# Patient Record
Sex: FEMALE | Race: OTHER | NOT HISPANIC OR LATINO | Employment: FULL TIME | URBAN - METROPOLITAN AREA
[De-identification: names, ages, dates, MRNs, and addresses within clinical notes are randomized per-mention and may not be internally consistent; named-entity substitution may affect disease eponyms.]

---

## 2023-09-29 LAB — TOBACCO SCREEN, URINE: NEGATIVE

## 2024-01-29 ENCOUNTER — OFFICE VISIT (OUTPATIENT)
Dept: OBSTETRICS AND GYNECOLOGY | Facility: CLINIC | Age: 44
End: 2024-01-29
Payer: COMMERCIAL

## 2024-01-29 VITALS
BODY MASS INDEX: 25.58 KG/M2 | WEIGHT: 139 LBS | HEIGHT: 62 IN | DIASTOLIC BLOOD PRESSURE: 76 MMHG | SYSTOLIC BLOOD PRESSURE: 118 MMHG

## 2024-01-29 DIAGNOSIS — Z12.31 VISIT FOR SCREENING MAMMOGRAM: ICD-10-CM

## 2024-01-29 DIAGNOSIS — Z13.1 DIABETES MELLITUS SCREENING: ICD-10-CM

## 2024-01-29 DIAGNOSIS — Z13.220 LIPID SCREENING: ICD-10-CM

## 2024-01-29 DIAGNOSIS — Z01.419 ENCOUNTER FOR ANNUAL ROUTINE GYNECOLOGICAL EXAMINATION: ICD-10-CM

## 2024-01-29 DIAGNOSIS — N81.4 UTERINE PROLAPSE: Primary | ICD-10-CM

## 2024-01-29 PROCEDURE — 87624 HPV HI-RISK TYP POOLED RSLT: CPT

## 2024-01-29 PROCEDURE — 88141 CYTOPATH C/V INTERPRET: CPT | Performed by: PATHOLOGY

## 2024-01-29 PROCEDURE — 99396 PREV VISIT EST AGE 40-64: CPT | Performed by: OBSTETRICS & GYNECOLOGY

## 2024-01-29 PROCEDURE — 88175 CYTOPATH C/V AUTO FLUID REDO: CPT

## 2024-01-29 ASSESSMENT — ENCOUNTER SYMPTOMS
HEMATURIA: 0
ARTHRALGIAS: 0
LIGHT-HEADEDNESS: 0
EYE REDNESS: 0
SHORTNESS OF BREATH: 0
SINUS PRESSURE: 0
FREQUENCY: 0
ABDOMINAL PAIN: 0
DIZZINESS: 0
RHINORRHEA: 0
WHEEZING: 0
COUGH: 0
JOINT SWELLING: 0
DYSPHORIC MOOD: 0
WEAKNESS: 0
NUMBNESS: 0
CHILLS: 0
BACK PAIN: 0
NERVOUS/ANXIOUS: 0
EYE DISCHARGE: 0
HEADACHES: 0
CONSTIPATION: 0
MYALGIAS: 0
VOMITING: 0
DIARRHEA: 0
PALPITATIONS: 0
SORE THROAT: 0
DYSURIA: 0
CHEST TIGHTNESS: 0
NAUSEA: 0
FEVER: 0
DIFFICULTY URINATING: 0

## 2024-01-29 ASSESSMENT — PAIN SCALES - GENERAL: PAINLEVEL: 0-NO PAIN

## 2024-01-29 NOTE — PROGRESS NOTES
I saw and evaluated the patient. I personally obtained the key and critical portions of the history and physical exam or was physically present for key and critical portions performed by the resident/fellow. I reviewed the resident/fellow's documentation and discussed the patient with the resident/fellow. I agree with the resident/fellow's medical decision making as documented in the note.    Edyta Wilkinson MD

## 2024-01-29 NOTE — PROGRESS NOTES
Subjective   Patient ID: Chanda Licona is a 43 y.o. female who presents for Annual Exam.    HPI  - Hx of uterine prolapse  - Primarily incontinence symptoms, worse with coughing.   - Thinks she might have rectocele now, constipation, and with standing she can feel it descending the longer she stands  - Tried pessary, did not stay in place  - Has not been to PT d/t schedule, she is an interventional cardiologist at [employer]  - Does Elena consistently  - Has previously seen Dr. Holbrook  - Dr Morrison primary care. Has not seen her for a few years. Wondering if Dr. Wilkinson can check her basic annual labs.   - Discussed colonoscopy now that she is 42yo.  - Patient with IUD in place    Review of Systems   Constitutional:  Negative for chills and fever.   HENT:  Negative for congestion, ear pain, rhinorrhea, sinus pressure and sore throat.    Eyes:  Negative for discharge, redness and visual disturbance.   Respiratory:  Negative for cough, chest tightness, shortness of breath and wheezing.    Cardiovascular:  Negative for chest pain, palpitations and leg swelling.   Gastrointestinal:  Negative for abdominal pain, constipation, diarrhea, nausea and vomiting.   Genitourinary:  Negative for difficulty urinating, dysuria, frequency, hematuria, urgency, vaginal bleeding, vaginal discharge and vaginal pain.   Musculoskeletal:  Negative for arthralgias, back pain, joint swelling and myalgias.   Skin:  Negative for rash.   Neurological:  Negative for dizziness, weakness, light-headedness, numbness and headaches.   Psychiatric/Behavioral:  Negative for behavioral problems and dysphoric mood. The patient is not nervous/anxious.      Objective   Physical Exam  Exam conducted with a chaperone present.   Constitutional:       Appearance: Normal appearance. She is normal weight.   HENT:      Head: Normocephalic and atraumatic.   Pulmonary:      Effort: Pulmonary effort is normal.   Chest:   Breasts:     Right: Normal.      Left:  Normal.   Genitourinary:     General: Normal vulva.      Vagina: Normal.      Cervix: Normal.      Uterus: With uterine prolapse.       Adnexa: Right adnexa normal and left adnexa normal.      Rectum: Normal.      Comments: Rectocele visible   Skin:     General: Skin is warm and dry.   Neurological:      General: No focal deficit present.      Mental Status: She is alert.   Psychiatric:         Mood and Affect: Mood normal.         Behavior: Behavior normal.       Assessment/Plan   Chanda is a 42 yo female who presents for annual well women's exam. Patient also with concern that her uterine prolapse has worsened and possibly developing a rectocele as well.    Problem List Items Addressed This Visit    None  Visit Diagnoses         Codes    Uterine prolapse    -  Primary  - Patient previously saw Dr. Holbrook, will put in a referral N81.4    Relevant Orders    Referral to Urogynecology    Encounter for annual routine gynecological examination      - Patient with hx of ASCUS last Pap  - Will follow up with results Z01.419    Relevant Orders    THINPREP PAP    CBC    TSH with reflex to Free T4 if abnormal    Comprehensive Metabolic Panel    Visit for screening mammogram     Z12.31    Relevant Orders    BI mammo bilateral screening tomosynthesis    Lipid screening     Z13.220    Relevant Orders    Lipid Panel   - Patient requesting basic annual lab work and will follow up with her PCP  - RTC for annual well women's or sooner if needed.     Patient was seen and discussed with attending physician Minh.    Leyla Dent,   Family Medicine, PGY-2

## 2024-02-12 LAB
CYTOLOGY CMNT CVX/VAG CYTO-IMP: NORMAL
HPV HR 12 DNA GENITAL QL NAA+PROBE: NEGATIVE
HPV HR GENOTYPES PNL CVX NAA+PROBE: NEGATIVE
HPV16 DNA SPEC QL NAA+PROBE: NEGATIVE
HPV18 DNA SPEC QL NAA+PROBE: NEGATIVE
LAB AP HPV GENOTYPE QUESTION: YES
LAB AP HPV HR: NORMAL
LABORATORY COMMENT REPORT: NORMAL
PATH REPORT.TOTAL CANCER: NORMAL

## 2024-02-26 ENCOUNTER — TELEPHONE (OUTPATIENT)
Dept: OBSTETRICS AND GYNECOLOGY | Facility: CLINIC | Age: 44
End: 2024-02-26
Payer: COMMERCIAL

## 2024-02-26 NOTE — TELEPHONE ENCOUNTER
----- Message from Edyta Wilkinson MD sent at 2/25/2024 10:50 PM EST -----  Repeat PAP in 12 months

## 2024-02-27 ENCOUNTER — TELEPHONE (OUTPATIENT)
Dept: OBSTETRICS AND GYNECOLOGY | Facility: CLINIC | Age: 44
End: 2024-02-27
Payer: COMMERCIAL

## 2024-02-27 NOTE — TELEPHONE ENCOUNTER
Contacted pt and verified name and .  Pt notified of her test results and per Dr. Wilkinson pt is too repeat pap in one  year  Pt states understanding and denies having questions at this time.

## 2024-04-08 ENCOUNTER — OFFICE VISIT (OUTPATIENT)
Dept: OBSTETRICS AND GYNECOLOGY | Facility: CLINIC | Age: 44
End: 2024-04-08
Payer: COMMERCIAL

## 2024-04-08 VITALS
HEIGHT: 62 IN | DIASTOLIC BLOOD PRESSURE: 61 MMHG | SYSTOLIC BLOOD PRESSURE: 95 MMHG | HEART RATE: 73 BPM | WEIGHT: 142 LBS | BODY MASS INDEX: 26.13 KG/M2

## 2024-04-08 DIAGNOSIS — N81.4 CYSTOCELE WITH UTERINE PROLAPSE: Primary | ICD-10-CM

## 2024-04-08 DIAGNOSIS — N81.4 UTERINE PROLAPSE: ICD-10-CM

## 2024-04-08 LAB
POC APPEARANCE, URINE: CLEAR
POC BILIRUBIN, URINE: NEGATIVE
POC BLOOD, URINE: ABNORMAL
POC COLOR, URINE: YELLOW
POC GLUCOSE, URINE: NEGATIVE MG/DL
POC KETONES, URINE: NEGATIVE MG/DL
POC LEUKOCYTES, URINE: ABNORMAL
POC NITRITE,URINE: NEGATIVE
POC PH, URINE: 6 PH
POC PROTEIN, URINE: NEGATIVE MG/DL
POC SPECIFIC GRAVITY, URINE: >=1.03
POC UROBILINOGEN, URINE: 0.2 EU/DL

## 2024-04-08 PROCEDURE — 1036F TOBACCO NON-USER: CPT | Performed by: OBSTETRICS & GYNECOLOGY

## 2024-04-08 PROCEDURE — 51798 US URINE CAPACITY MEASURE: CPT | Performed by: OBSTETRICS & GYNECOLOGY

## 2024-04-08 PROCEDURE — 99213 OFFICE O/P EST LOW 20 MIN: CPT | Performed by: OBSTETRICS & GYNECOLOGY

## 2024-04-08 PROCEDURE — 81003 URINALYSIS AUTO W/O SCOPE: CPT | Mod: 91,QW | Performed by: OBSTETRICS & GYNECOLOGY

## 2024-04-08 ASSESSMENT — PAIN SCALES - GENERAL: PAINLEVEL: 0-NO PAIN

## 2024-04-08 NOTE — PROGRESS NOTES
Urogynecology  Provider:  Dana Holbrook MD  [phone]              ASSESSMENT AND PLAN:     Problem List Items Addressed This Visit    None          I spent a total of eConsult Time: 25 minutes in face to face and non face to face time.        Dana Holbrook MD        HISTORY OF PRESENT ILLNESS:     Last seen 2021 for POP that was stable  Was to have IUD changed with Minh in   POP-Q: Stage: 2 and patient was standing. Aa: 0. C: -2 TVL: 10 Ap: -3. D: -3.      Urge incontinence is worse.  She is feeling like POP is worse.  She is having some more constipation recently. We discussed adding some fiber to her diet and hydrating and that this is more likely due to stool consistency. She is worried that she might have a small rectocele as well per Dr. Wilkinson.  She tried a pessary in the past and fell out so not an option.    She most notices the POP when she is in the cath lab doing procedures at the end of the day.   She likes the POP is larger.    When she has a URI she does leak urine with sneeze and cough as well.     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     Past Medical History:      Past Medical History:   Diagnosis Date    Personal history of other diseases of the nervous system and sense organs     History of migraine    Unspecified asthma, uncomplicated     Asthma, acute          Past Surgical History:       Past Surgical History:   Procedure Laterality Date     SECTION, CLASSIC  10/25/2016     Section         Medications:       Prior to Admission medications    Not on File        ROS  Review of Systems     Blood, Urine   Date Value Ref Range Status   2021 SMALL (1+) (A) NEGATIVE Final     Nitrite, Urine   Date Value Ref Range Status   2021 NEGATIVE NEGATIVE Final     Urobilinogen, Urine   Date Value Ref Range Status   2021 <2.0 0.0 - 1.9 mg/dL Final         PHYSICAL EXAM:      There were no vitals taken for this visit.     No LMP recorded. Patient  "has had an implant.      Declines chaperone for physical exam.      Well developed, well nourished, in no apparent distress.   Neurologic/Psychiatric:  Awake, Alert and Oriented times 3.  Affect normal.     GENITAL/URINARY:       External Genitalia:  The patient has normal appearing external genitalia, normal skenes and bartholins glands, and a normal hair distribution.  Her vulva is without lesions, erythema or discharge.  It is non-tender with appropriate sensation.     Urethral Meatus:  Size normal, Location normal, Lesions absent, Prolapse absent,      Urethra:  Fullness absent, Masses absent,      Bladder:  Fullness absent, Masses absent, Tenderness absent,      Vagina:  General appearance normal, Estrogen effect normal, Discharge absent, Lesions absent, normal     Cervix: Normal, no discharge.   Uterus:  normal size  Adnexa: normal     Stress urinary incontinence not demonstrable.         POP-Q:  Stage: 2  Position: standing    Aa: 0       Ba:  C: +1   Gh:  Pb:  TVL: 10         Ap: -1 Bp:  D: -2               Data and DIAGNOSTIC STUDIES REVIEWED   No results found.   No results found for: \"URINECULTURE\", \"UAMICCOMM\"   Lab Results   Component Value Date    GLUCOSE 87 08/18/2021    CALCIUM 9.2 08/18/2021     08/18/2021    K 4.2 08/18/2021    CO2 26 08/18/2021     08/18/2021    BUN 11 08/18/2021    CREATININE 0.70 08/18/2021     Lab Results   Component Value Date    WBC 7.7 08/18/2021    HGB 14.2 08/18/2021    HCT 46.2 (H) 08/18/2021    MCV 91 08/18/2021     08/18/2021        We discussed frankly that her POP is worse and her cervix is now at +1 which explains her worsening symptoms.  She is not ready to have a surgical repair but we discussed what this would entail, including the procedure and time off.  Given her degree of POP and young age I do think that she would most likely benefit from a RSCH, RSCPXY, BS, MUS, poss post rep, perineorr. We discussed outcomes and risks and benefits.  She " has a HX of an ASCUS pap in Jan 2024 but HPV (-). She will need followup. She might consider a total hyst.  She will also clearly need a sling when she opts to have surgery.    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   Beyond this, she is personally bothered enough to have surgery at this time.  We discussed that the treatment (surgery) will be the same even if her POP gets worse and there is no reason to limit her daily activities currently.  She tried a pessary previously without success so this is not an option.  We agreed to have her come in to be seen sooner and she will FU in 4-5 months  We will monitor her for now.  She is also considering doing the [employer] in the near future.    Dana Holbrook MD

## 2024-05-10 ENCOUNTER — HOSPITAL ENCOUNTER (OUTPATIENT)
Dept: RADIOLOGY | Facility: CLINIC | Age: 44
Discharge: HOME | End: 2024-05-10
Payer: COMMERCIAL

## 2024-05-10 VITALS — HEIGHT: 62 IN | BODY MASS INDEX: 26.13 KG/M2 | WEIGHT: 141.98 LBS

## 2024-05-10 DIAGNOSIS — Z12.31 VISIT FOR SCREENING MAMMOGRAM: ICD-10-CM

## 2024-05-10 PROCEDURE — 77063 BREAST TOMOSYNTHESIS BI: CPT | Performed by: STUDENT IN AN ORGANIZED HEALTH CARE EDUCATION/TRAINING PROGRAM

## 2024-05-10 PROCEDURE — 77067 SCR MAMMO BI INCL CAD: CPT

## 2024-05-10 PROCEDURE — 77067 SCR MAMMO BI INCL CAD: CPT | Performed by: STUDENT IN AN ORGANIZED HEALTH CARE EDUCATION/TRAINING PROGRAM

## 2024-09-15 NOTE — PROGRESS NOTES
Urogynecology  Provider:  Dana Holbrook MD  [phone]      ASSESSMENT AND PLAN:   44 year old female with stage 3 uterovaginal prolapse and UUI.     Diagnoses:  #1 Uterovaginal prolapse, stage 3  #2 Overactive bladder    Plan:  1. Uterovaginal prolapse  - We reviewed the only time to consider intervention for POP is when a patient has urinary retention from the prolapse causing inability to completley empty their bladder or the POP becomes symptomatically bothersome.   - Reviewed surgical options to reduce POP such as lx USLS, TLH, BS, WV/perineorrhaphy vs. SCPXY with WV/perineorrhaphy and TLH (given her ASC-US pap in January 2024. She is more interested in the USLS > SCPXY. We reassured her that we do not use Vicryl suture material but rather use 2-0 Goretex sutures in the USLS which is a permanent suture and V-Loc PDS suturevto close the vaginal cuff after the TLH. However, at this time she wishes to continue monitoring her prolapse symptoms at this time.   - Pop-Q today Aa: 0, C: +2, TVL: 10, and Ap: -1.  Very minimal POP progression noted.  - Continue POP surveillance in 6-8 months and splinting as needed.   - Discussed that surgery will likely be needed at some point but she does not appear to be that symptomatic currently to justify.  - We did discuss surgery options and currently I would rec a TLH/USLS/BS/A&P/perineorr/poss MUS  - She does endorse some PATRICE symptoms    2. OAB, UUI  - PVR = 0mL by bladder U/S.  - She is not really bothered by POP but mostly with OAB symptoms  - Reviewed that UUI may or may not improve with a POP repair surgery as UUI is mostly attributed to a neurological parasympathetic overactivity between the brain and bladder.   - Discussed treatment options for managing OAB such as starting a medication (Solifenacin). She is amenable to this plan.   - Sent Rx of Solifenacin 5mg to her preferred pharmacy with instructions to take 1 pill po QD. This medication will help improve  bladder compliance by decreasing intensity of the bladder spasms thus improving urinary urgency and ability to make it to the bathroom in time without experiencing UUI. Discussed the drug profile being an anticholinergic and possible medication side effects including dry mouth, dry eyes, and constipation.    Follow up in 2 months with Dr. Holbrook for a virtual visit for an OAB medication check.     Scribe Attestation  By signing my name below, I, Rad Farrell, Shyanneibe, attest that this documentation has been prepared under the direction and in the presence of Dana Holbrook MD on 09/16/2024 at 6:03 PM.     Agree with above. I Dr. Holbrook, personally performed the services described in the documentation which was scribed virtually and confirm it is both complete and accurate.  Dana Holbrook MD        Problem List Items Addressed This Visit    None          I spent a total of eConsult Time: 35 minutes in face to face and non face to face time.        Dana Holbrook MD        HISTORY OF PRESENT ILLNESS:   44 year old female following up for a POP check.     Record Review:   - Last visit 4/2024:  We discussed frankly that her POP is worse and her cervix is now at +1 which explains her worsening symptoms.  She is not ready to have a surgical repair but we discussed what this would entail, including the procedure and time off.  Given her degree of POP and young age I do think that she would most likely benefit from a RSCH, RSCPXY, BS, MUS, poss post rep, perineorr. We discussed outcomes and risks and benefits.  She has a HX of an ASCUS pap in Jan 2024 but HPV (-). She will need followup. She might consider a total hyst.  She will also clearly need a sling when she opts to have surgery.    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   Beyond this, she is personally bothered enough to have surgery at this time.  We discussed that the treatment (surgery)  will be the same even if her POP gets worse and there is no reason to limit her daily activities currently.  She tried a pessary previously without success so this is not an option.  We agreed to have her come in to be seen sooner and she will FU in 4-5 months  We will monitor her for now.    Pop-Q: Aa 0  C +1  TVL 10  Ap -1     Prolapse Symptoms:  - Her prolapse has slightly worsened in stage or degree of bother in comparison to x6 months ago and she attributes this to working more while her partner is on maternity leave.   - She continues to splint her POP 1-2x per day dependent upon the time she stands. However, she notes that after a 2 hour procedure she begins to feel uncomfortable due to her prolapse.      Urinary Symptoms:   - Patient reports being bothered by UUI.   - She has noticed that a decrease in her ability to control her bladder.     OBGYN History:  - 2024 pap: ASC-US with plan to repeat in 12 months after that time.       Past Medical History:    Past Medical History:   Diagnosis Date    Personal history of other diseases of the nervous system and sense organs     History of migraine    Unspecified asthma, uncomplicated ([facility])     Asthma, acute        Past Surgical History:     Past Surgical History:   Procedure Laterality Date     SECTION, CLASSIC  10/25/2016     Section       Medications:     Prior to Admission medications    Not on File        ROS  Review of Systems   Constitutional: Negative.    HENT: Negative.     Eyes: Negative.    Respiratory: Negative.     Cardiovascular: Negative.    Gastrointestinal: Negative.    Endocrine: Negative.    Genitourinary:  Positive for enuresis and urgency.   Musculoskeletal: Negative.    Neurological: Negative.    Psychiatric/Behavioral: Negative.          POC Blood, Urine   Date Value Ref Range Status   2024 TRACE-Lysed (A) NEGATIVE Final     Poc Nitrite, Urine   Date Value Ref Range Status   2024 NEGATIVE NEGATIVE Final  "    POC Urobilinogen, Urine   Date Value Ref Range Status   04/08/2024 0.2 0.2, 1.0 EU/DL Final     POC Leukocytes, Urine   Date Value Ref Range Status   04/08/2024 SMALL (1+) (A) NEGATIVE Final         PHYSICAL EXAM:    There were no vitals taken for this visit.  No LMP recorded. Patient has had an implant.      Declines chaperone for physical exam.      Well developed, well nourished, in no apparent distress.   Neurologic/Psychiatric:  Awake, Alert and Oriented times 3.  Affect normal.     GENITAL/URINARY:     External Genitalia:  The patient has normal appearing external genitalia, normal skenes and bartholins glands, and a normal hair distribution.  Her vulva is without lesions, erythema or discharge.  It is non-tender with appropriate sensation.     Urethral Meatus:  Size normal, Location normal, Lesions absent, Prolapse absent.    Urethra:  Fullness absent, Masses absent.    Bladder:  Fullness absent, Masses absent, Tenderness absent.    Vagina:  General appearance normal, Estrogen effect normal, Discharge absent, Lesions absent. Normal vaginal epithelium.      Cervix: Normal, no discharge.   Uterus:  normal size, mobile, and nontender  Adnexa: normal     Anus/Perineum:  Lesions absent and masses absent. Normal anus and perineum.      Stress urinary incontinence not demonstrable.         POP-Q:  Stage: 3  Position: standing    Aa: 0       Ba: 0 C: +2   Gh:  Pb:  TVL: 10         Ap: -1 Bp: -1 D: 0               Data and DIAGNOSTIC STUDIES REVIEWED   No results found.   No results found for: \"URINECULTURE\", \"UAMICCOMM\"   Lab Results   Component Value Date    GLUCOSE 87 08/18/2021    CALCIUM 9.2 08/18/2021     08/18/2021    K 4.2 08/18/2021    CO2 26 08/18/2021     08/18/2021    BUN 11 08/18/2021    CREATININE 0.70 08/18/2021     Lab Results   Component Value Date    WBC 7.7 08/18/2021    HGB 14.2 08/18/2021    HCT 46.2 (H) 08/18/2021    MCV 91 08/18/2021     08/18/2021          Dana T " MD Yusef

## 2024-09-16 ENCOUNTER — OFFICE VISIT (OUTPATIENT)
Dept: OBSTETRICS AND GYNECOLOGY | Facility: CLINIC | Age: 44
End: 2024-09-16
Payer: COMMERCIAL

## 2024-09-16 VITALS
BODY MASS INDEX: 25.52 KG/M2 | DIASTOLIC BLOOD PRESSURE: 73 MMHG | WEIGHT: 144 LBS | HEIGHT: 63 IN | SYSTOLIC BLOOD PRESSURE: 110 MMHG | HEART RATE: 83 BPM

## 2024-09-16 DIAGNOSIS — N32.81 OVERACTIVE BLADDER: Primary | ICD-10-CM

## 2024-09-16 DIAGNOSIS — N39.9 URINARY DISORDER: ICD-10-CM

## 2024-09-16 LAB
POC APPEARANCE, URINE: CLEAR
POC BILIRUBIN, URINE: NEGATIVE
POC BLOOD, URINE: ABNORMAL
POC COLOR, URINE: YELLOW
POC GLUCOSE, URINE: ABNORMAL MG/DL
POC KETONES, URINE: NEGATIVE MG/DL
POC LEUKOCYTES, URINE: NEGATIVE
POC NITRITE,URINE: NEGATIVE
POC PH, URINE: 6.5 PH
POC PROTEIN, URINE: NEGATIVE MG/DL
POC SPECIFIC GRAVITY, URINE: 1.02
POC UROBILINOGEN, URINE: 0.2 EU/DL

## 2024-09-16 PROCEDURE — 51798 US URINE CAPACITY MEASURE: CPT | Performed by: OBSTETRICS & GYNECOLOGY

## 2024-09-16 PROCEDURE — 81003 URINALYSIS AUTO W/O SCOPE: CPT | Performed by: OBSTETRICS & GYNECOLOGY

## 2024-09-16 PROCEDURE — 3008F BODY MASS INDEX DOCD: CPT | Performed by: OBSTETRICS & GYNECOLOGY

## 2024-09-16 PROCEDURE — 99214 OFFICE O/P EST MOD 30 MIN: CPT | Performed by: OBSTETRICS & GYNECOLOGY

## 2024-09-16 RX ORDER — SOLIFENACIN SUCCINATE 5 MG/1
5 TABLET, FILM COATED ORAL DAILY
Qty: 30 TABLET | Refills: 3 | Status: SHIPPED | OUTPATIENT
Start: 2024-09-16 | End: 2025-09-16

## 2024-09-16 ASSESSMENT — ENCOUNTER SYMPTOMS
ENDOCRINE NEGATIVE: 1
CARDIOVASCULAR NEGATIVE: 1
EYES NEGATIVE: 1
GASTROINTESTINAL NEGATIVE: 1
CONSTITUTIONAL NEGATIVE: 1
PSYCHIATRIC NEGATIVE: 1
MUSCULOSKELETAL NEGATIVE: 1
RESPIRATORY NEGATIVE: 1
NEUROLOGICAL NEGATIVE: 1

## 2024-09-16 ASSESSMENT — PAIN SCALES - GENERAL: PAINLEVEL: 0-NO PAIN

## 2024-11-10 NOTE — PROGRESS NOTES
Urogynecology  Provider:  Dana Holbrook MD  [phone]              ASSESSMENT AND PLAN:   44-year-old female being assessed for stage 3 uterovaginal prolapse and overactive bladder.    Diagnoses:  #1 Stage 3 uterovaginal prolapse  #2 Overactive bladder    Plan:  Stage 3 uterovaginal prolapse  - She has a hx of unsuccessful trying a pessary for prolapse. Unable to retain the pessary in past due to POP size.  - She is considering surgical intervention for prolapse, as she is becoming more bothered by the bulge being down and needing to push it up while at work.  - Plan to perform robotic scpxy/RSCH to address POP.  - Schedule UDS prior to the surgery.  - Ordered UDS.  - Will consider placing a sling during surgery to address PATRICE.  - Discussed potential recovery time and options for returning to work.  - Plan for surgery over the summer, with a tentative date for after June 24.    2. OAB  - Continue Vesicare 5 mg once daily at night time.    Follow-up with Dr. Holbrook post-UDN to discuss results and finalize surgical plans.  Case request placed.    Virtual visit today: The patient's identity was confirmed and that she is located in [place].   Dana Holbrook MD identified herself and the patient consented to a telehealth visit today. Telehealth visit time: 16 minutes    Scribe Attestation  By signing my name below, IJann Scribe, attest that this documentation has been prepared under the direction and in the presence of Dana Holbrook MD on 11/11/2024 at 5:26 PM.    Agree with above. I Dr. Holbrook, personally performed the services described in the documentation which was scribed virtually and confirm it is both complete and accurate.  Dana Holbrook MD      Problem List Items Addressed This Visit    None          I spent a total of 20 minutes in face to face and non face to face time at this virtual visit.          Dana Holbrook MD        HISTORY OF PRESENT ILLNESS:     Last visit  9/2024  ASSESSMENT AND PLAN:   44 year old female with stage 3 uterovaginal prolapse and UUI.      Diagnoses:  #1 Uterovaginal prolapse, stage 3  #2 Overactive bladder     Plan:  1. Uterovaginal prolapse  - We reviewed the only time to consider intervention for POP is when a patient has urinary retention from the prolapse causing inability to completley empty their bladder or the POP becomes symptomatically bothersome.   - Reviewed surgical options to reduce POP such as lx USLS, TLH, BS, VA/perineorrhaphy vs. SCPXY with VA/perineorrhaphy and TLH (given her ASC-US pap in January 2024. She is more interested in the USLS > SCPXY. We reassured her that we do not use Vicryl suture material but rather use 2-0 Goretex sutures in the USLS which is a permanent suture and V-Loc PDS suturevto close the vaginal cuff after the TLH. However, at this time she wishes to continue monitoring her prolapse symptoms at this time.   - Pop-Q today Aa: 0, C: +2, TVL: 10, and Ap: -1.  Very minimal POP progression noted.  - Continue POP surveillance in 6-8 months and splinting as needed.   - Discussed that surgery will likely be needed at some point but she does not appear to be that symptomatic currently to justify.  - We did discuss surgery options and currently I would rec a TLH/USLS/BS/A&P/perineorr/poss MUS  - She does endorse some PATRICE symptoms     2. OAB, UUI  - PVR = 0mL by bladder U/S.  - She is not really bothered by POP but mostly with OAB symptoms  - Reviewed that UUI may or may not improve with a POP repair surgery as UUI is mostly attributed to a neurological parasympathetic overactivity between the brain and bladder.   - Discussed treatment options for managing OAB such as starting a medication (Solifenacin). She is amenable to this plan.   - Sent Rx of Solifenacin 5mg to her preferred pharmacy with instructions to take 1 pill po QD. This medication will help improve bladder compliance by decreasing intensity of the bladder  spasms thus improving urinary urgency and ability to make it to the bathroom in time without experiencing UUI. Discussed the drug profile being an anticholinergic and possible medication side effects including dry mouth, dry eyes, and constipation.     Follow up in 2 months with Dr. Holbrook for a virtual visit for an OAB medication check.           Prolapse Symptoms :  - She tried a pessary 10 years ago that just continued to fall out.  - She is considering surgical intervention for prolapse, which is causing significant bother due to stretchy tissue.  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  - She has to push the bulge up after standing for extended periods.  - She feels emotionally and physically discomforted by the prolapse and is considering surgical intervention, just not instantly.     Urinary Symptoms:   - The medication is not helping PATRICE but it is helping UUI to some point.  - She takes it during night so it doesn't cause bothersome side effects.  - She notes improved urgency.  - She experienced bad PATRICE during a recent URI.       Past Medical History:       Past Medical History:   Diagnosis Date    Personal history of other diseases of the nervous system and sense organs     History of migraine    Unspecified asthma, uncomplicated ([facility])     Asthma, acute          Past Surgical History:       Past Surgical History:   Procedure Laterality Date     SECTION, CLASSIC  10/25/2016     Section         Medications:       Prior to Admission medications    Medication Sig Start Date End Date Taking? Authorizing Provider   solifenacin (Vesicare) 5 mg tablet Take 1 tablet (5 mg) by mouth once daily. Swallow tablet whole; do not crush, chew, or split. 24  Dana Holbrook MD        ROS  Review of Systems   Constitutional: Negative.    HENT: Negative.     Eyes: Negative.    Respiratory: Negative.     Cardiovascular:  "Negative.    Gastrointestinal: Negative.    Endocrine: Negative.    Genitourinary:  Positive for frequency and urgency.        PATRICE   Musculoskeletal: Negative.    Neurological: Negative.    Psychiatric/Behavioral: Negative.            Data and DIAGNOSTIC STUDIES REVIEWED   No results found.   No results found for: \"URINECULTURE\", \"UAMICCOMM\"   Lab Results   Component Value Date    GLUCOSE 87 08/18/2021    CALCIUM 9.2 08/18/2021     08/18/2021    K 4.2 08/18/2021    CO2 26 08/18/2021     08/18/2021    BUN 11 08/18/2021    CREATININE 0.70 08/18/2021     Lab Results   Component Value Date    WBC 7.7 08/18/2021    HGB 14.2 08/18/2021    HCT 46.2 (H) 08/18/2021    MCV 91 08/18/2021     08/18/2021            "

## 2024-11-11 ENCOUNTER — PREP FOR PROCEDURE (OUTPATIENT)
Dept: OBSTETRICS AND GYNECOLOGY | Facility: CLINIC | Age: 44
End: 2024-11-11
Payer: COMMERCIAL

## 2024-11-11 ENCOUNTER — TELEMEDICINE (OUTPATIENT)
Dept: OBSTETRICS AND GYNECOLOGY | Facility: CLINIC | Age: 44
End: 2024-11-11
Payer: COMMERCIAL

## 2024-11-11 DIAGNOSIS — N81.4 UTERINE PROLAPSE: Primary | ICD-10-CM

## 2024-11-11 DIAGNOSIS — N39.3 STRESS INCONTINENCE: ICD-10-CM

## 2024-11-11 DIAGNOSIS — N81.2 UTEROVAGINAL PROLAPSE, INCOMPLETE: Primary | ICD-10-CM

## 2024-11-11 PROCEDURE — 99213 OFFICE O/P EST LOW 20 MIN: CPT | Performed by: OBSTETRICS & GYNECOLOGY

## 2024-11-11 RX ORDER — PHENAZOPYRIDINE HYDROCHLORIDE 200 MG/1
200 TABLET, FILM COATED ORAL ONCE
OUTPATIENT
Start: 2024-11-11 | End: 2024-11-11

## 2024-11-11 RX ORDER — ACETAMINOPHEN 325 MG/1
975 TABLET ORAL ONCE
OUTPATIENT
Start: 2024-11-11 | End: 2024-11-11

## 2024-11-11 RX ORDER — GABAPENTIN 600 MG/1
600 TABLET ORAL ONCE
OUTPATIENT
Start: 2024-11-11 | End: 2024-11-11

## 2024-11-11 RX ORDER — CELECOXIB 400 MG/1
400 CAPSULE ORAL ONCE
OUTPATIENT
Start: 2024-11-11 | End: 2024-11-11

## 2024-11-12 ASSESSMENT — ENCOUNTER SYMPTOMS
FREQUENCY: 1
CONSTITUTIONAL NEGATIVE: 1
EYES NEGATIVE: 1
RESPIRATORY NEGATIVE: 1
NEUROLOGICAL NEGATIVE: 1
CARDIOVASCULAR NEGATIVE: 1
PSYCHIATRIC NEGATIVE: 1
ENDOCRINE NEGATIVE: 1
MUSCULOSKELETAL NEGATIVE: 1
GASTROINTESTINAL NEGATIVE: 1

## 2024-12-09 PROBLEM — N39.3 STRESS INCONTINENCE: Status: ACTIVE | Noted: 2024-11-11

## 2024-12-09 PROBLEM — N81.2 UTEROVAGINAL PROLAPSE, INCOMPLETE: Status: ACTIVE | Noted: 2024-11-11

## 2024-12-28 ENCOUNTER — TELEPHONE (OUTPATIENT)
Dept: OBSTETRICS AND GYNECOLOGY | Facility: CLINIC | Age: 44
End: 2024-12-28
Payer: COMMERCIAL

## 2024-12-28 NOTE — TELEPHONE ENCOUNTER
Originally spoke with patient back in 11/20/2024 regarding possible dates for surgery with Dr Holbrook at [facility]. Discussed dates between May and June 2025. She agree with date of 6/6/2025. Procedure finalized for a robotic supracervical hysterectomy, bilateral salpingectomy, sacrocolpopexy. In addition to posterior repair with perineorraphy, possible mid-urethral sling and cystoscopy. CPM not ordered at this time. Patient has appointment for UDS 4/23/2025 @ 245 pm and virtual pre-op appointment with Dr Holbrook on 4/28/2025 @ 515 pm.

## 2025-02-03 ENCOUNTER — APPOINTMENT (OUTPATIENT)
Dept: OBSTETRICS AND GYNECOLOGY | Facility: CLINIC | Age: 45
End: 2025-02-03
Payer: COMMERCIAL

## 2025-02-03 VITALS
SYSTOLIC BLOOD PRESSURE: 112 MMHG | HEIGHT: 64 IN | DIASTOLIC BLOOD PRESSURE: 76 MMHG | BODY MASS INDEX: 24.86 KG/M2 | WEIGHT: 145.6 LBS

## 2025-02-03 DIAGNOSIS — Z01.419 WELL WOMAN EXAM: Primary | ICD-10-CM

## 2025-02-03 DIAGNOSIS — Z12.4 SCREENING FOR MALIGNANT NEOPLASM OF CERVIX: ICD-10-CM

## 2025-02-03 DIAGNOSIS — Z12.11 SCREENING FOR COLON CANCER: ICD-10-CM

## 2025-02-03 PROCEDURE — 88141 CYTOPATH C/V INTERPRET: CPT | Performed by: PATHOLOGY

## 2025-02-03 PROCEDURE — 88175 CYTOPATH C/V AUTO FLUID REDO: CPT

## 2025-02-03 PROCEDURE — 99396 PREV VISIT EST AGE 40-64: CPT | Performed by: OBSTETRICS & GYNECOLOGY

## 2025-02-03 PROCEDURE — 3008F BODY MASS INDEX DOCD: CPT | Performed by: OBSTETRICS & GYNECOLOGY

## 2025-02-03 PROCEDURE — 87624 HPV HI-RISK TYP POOLED RSLT: CPT

## 2025-02-03 ASSESSMENT — ENCOUNTER SYMPTOMS
GASTROINTESTINAL NEGATIVE: 0
PSYCHIATRIC NEGATIVE: 0
ALLERGIC/IMMUNOLOGIC NEGATIVE: 0
LOSS OF SENSATION IN FEET: 0
DEPRESSION: 0
CARDIOVASCULAR NEGATIVE: 0
ENDOCRINE NEGATIVE: 0
HEMATOLOGIC/LYMPHATIC NEGATIVE: 0
OCCASIONAL FEELINGS OF UNSTEADINESS: 0
EYES NEGATIVE: 0
MUSCULOSKELETAL NEGATIVE: 0
RESPIRATORY NEGATIVE: 0
NEUROLOGICAL NEGATIVE: 0
CONSTITUTIONAL NEGATIVE: 0

## 2025-02-03 ASSESSMENT — PAIN SCALES - GENERAL: PAINLEVEL_OUTOF10: 0-NO PAIN

## 2025-02-04 LAB
ALBUMIN SERPL-MCNC: 4.2 G/DL (ref 3.6–5.1)
ALP SERPL-CCNC: 76 U/L (ref 31–125)
ALT SERPL-CCNC: 14 U/L (ref 6–29)
ANION GAP SERPL CALCULATED.4IONS-SCNC: 9 MMOL/L (CALC) (ref 7–17)
AST SERPL-CCNC: 14 U/L (ref 10–35)
BILIRUB SERPL-MCNC: 0.4 MG/DL (ref 0.2–1.2)
BUN SERPL-MCNC: 12 MG/DL (ref 7–25)
CALCIUM SERPL-MCNC: 8.9 MG/DL (ref 8.6–10.2)
CHLORIDE SERPL-SCNC: 108 MMOL/L (ref 98–110)
CHOLEST SERPL-MCNC: 178 MG/DL
CHOLEST/HDLC SERPL: 5.6 (CALC)
CO2 SERPL-SCNC: 23 MMOL/L (ref 20–32)
CREAT SERPL-MCNC: 0.85 MG/DL (ref 0.5–0.99)
EGFRCR SERPLBLD CKD-EPI 2021: 86 ML/MIN/1.73M2
ERYTHROCYTE [DISTWIDTH] IN BLOOD BY AUTOMATED COUNT: 12.4 % (ref 11–15)
GLUCOSE SERPL-MCNC: 92 MG/DL (ref 65–139)
HCT VFR BLD AUTO: 41.2 % (ref 35–45)
HDLC SERPL-MCNC: 32 MG/DL
HGB BLD-MCNC: 13.4 G/DL (ref 11.7–15.5)
LDLC SERPL CALC-MCNC: 116 MG/DL (CALC)
MCH RBC QN AUTO: 28.2 PG (ref 27–33)
MCHC RBC AUTO-ENTMCNC: 32.5 G/DL (ref 32–36)
MCV RBC AUTO: 86.6 FL (ref 80–100)
NONHDLC SERPL-MCNC: 146 MG/DL (CALC)
PLATELET # BLD AUTO: 280 THOUSAND/UL (ref 140–400)
PMV BLD REES-ECKER: 12 FL (ref 7.5–12.5)
POTASSIUM SERPL-SCNC: 4 MMOL/L (ref 3.5–5.3)
PROT SERPL-MCNC: 6.6 G/DL (ref 6.1–8.1)
RBC # BLD AUTO: 4.76 MILLION/UL (ref 3.8–5.1)
SODIUM SERPL-SCNC: 140 MMOL/L (ref 135–146)
TRIGL SERPL-MCNC: 179 MG/DL
TSH SERPL-ACNC: 1.74 MIU/L
WBC # BLD AUTO: 8.4 THOUSAND/UL (ref 3.8–10.8)

## 2025-02-05 PROBLEM — Z01.419 WELL WOMAN EXAM: Status: ACTIVE | Noted: 2025-02-03

## 2025-02-05 NOTE — PROGRESS NOTES
Subjective   Patient ID: Chanda Licona is a 44 y.o. female who presents for Annual Exam (Last pap 1/24).  Pt is here for her annual exam  She also has uterine prolapse  ( Stage 3)and is planning surgery   She also has uterine incontinence.        Review of Systems   All other systems reviewed and are negative.      Objective   Physical Exam  Constitutional:       Appearance: Normal appearance. She is normal weight.   Pulmonary:      Effort: Pulmonary effort is normal.   Chest:   Breasts:     Right: Normal.      Left: Normal.   Genitourinary:     General: Normal vulva.      Vagina: Normal.      Cervix: Normal.      Uterus: Normal.       Adnexa: Right adnexa normal and left adnexa normal.      Rectum: Normal.   Musculoskeletal:      Cervical back: Neck supple.   Skin:     General: Skin is warm.   Neurological:      Mental Status: She is alert.   Psychiatric:         Attention and Perception: Attention normal.         Mood and Affect: Mood normal.         Behavior: Behavior normal.         Assessment/Plan   Diagnoses and all orders for this visit:  Well woman exam  -     CBC; Future  -     Comprehensive Metabolic Panel; Future  -     TSH with reflex to Free T4 if abnormal; Future  -     Lipid Panel; Future  Screening for malignant neoplasm of cervix  -     THINPREP PAP TEST  -     HPV DNA High Risk With Genotype  Screening for colon cancer  -     Colonoscopy Screening; Average Risk Patient; Future           Edyta Wilkinson MD 02/05/25 12:13 PM

## 2025-04-23 ENCOUNTER — APPOINTMENT (OUTPATIENT)
Dept: OBSTETRICS AND GYNECOLOGY | Facility: CLINIC | Age: 45
End: 2025-04-23
Payer: COMMERCIAL

## 2025-04-28 ENCOUNTER — APPOINTMENT (OUTPATIENT)
Dept: OBSTETRICS AND GYNECOLOGY | Facility: CLINIC | Age: 45
End: 2025-04-28
Payer: COMMERCIAL

## 2025-04-30 ENCOUNTER — APPOINTMENT (OUTPATIENT)
Dept: OBSTETRICS AND GYNECOLOGY | Facility: CLINIC | Age: 45
End: 2025-04-30
Payer: COMMERCIAL

## 2025-05-05 ENCOUNTER — APPOINTMENT (OUTPATIENT)
Dept: OBSTETRICS AND GYNECOLOGY | Facility: CLINIC | Age: 45
End: 2025-05-05
Payer: COMMERCIAL

## 2025-05-21 ENCOUNTER — PATIENT MESSAGE (OUTPATIENT)
Dept: UROLOGY | Facility: HOSPITAL | Age: 45
End: 2025-05-21
Payer: COMMERCIAL

## 2025-05-28 ENCOUNTER — APPOINTMENT (OUTPATIENT)
Dept: OBSTETRICS AND GYNECOLOGY | Facility: CLINIC | Age: 45
End: 2025-05-28
Payer: COMMERCIAL

## 2025-06-11 ENCOUNTER — PROCEDURE VISIT (OUTPATIENT)
Dept: OBSTETRICS AND GYNECOLOGY | Facility: CLINIC | Age: 45
End: 2025-06-11
Payer: COMMERCIAL

## 2025-06-11 ENCOUNTER — TELEPHONE (OUTPATIENT)
Dept: UROLOGY | Facility: HOSPITAL | Age: 45
End: 2025-06-11
Payer: COMMERCIAL

## 2025-06-11 DIAGNOSIS — N39.3 STRESS INCONTINENCE: ICD-10-CM

## 2025-06-11 PROCEDURE — 51797 INTRAABDOMINAL PRESSURE TEST: CPT | Performed by: OBSTETRICS & GYNECOLOGY

## 2025-06-11 PROCEDURE — 51798 US URINE CAPACITY MEASURE: CPT | Performed by: OBSTETRICS & GYNECOLOGY

## 2025-06-11 PROCEDURE — 51729 CYSTOMETROGRAM W/VP&UP: CPT | Performed by: OBSTETRICS & GYNECOLOGY

## 2025-06-11 PROCEDURE — 51741 ELECTRO-UROFLOWMETRY FIRST: CPT | Performed by: OBSTETRICS & GYNECOLOGY

## 2025-06-11 PROCEDURE — 51784 ANAL/URINARY MUSCLE STUDY: CPT | Performed by: OBSTETRICS & GYNECOLOGY

## 2025-06-11 NOTE — TELEPHONE ENCOUNTER
"6/11/2025  1:54 PM    Patient ID verified. Called patient to discuss research study titled \"Mood Changes After Pelvic Organ Prolapse Surgery\". Patient was interested in discussing the study. Patient declined Zoom call and requested follow up phone call. Consent form emailed to patient. Follow up phone call scheduled for today at 6 pm.    Roselyn Keller MD, ERIKA, BSN  URPS Fellow, PGY-5  "

## 2025-06-11 NOTE — TELEPHONE ENCOUNTER
"6/11/2025  6:10 PM    Patient ID verified. Patient consented to participate in the research study titled \"Mood Changes After Pelvic Organ Prolapse Surgery\" via phone. All questions answered. Patient signed consent form via  [employer].    Roselyn Keller MD, ERIKA, BSN  URPS Fellow, PGY-5   "

## 2025-06-15 NOTE — PROGRESS NOTES
Urogynecology  Provider:  Dana Holbrook MD  [phone]    ASSESSMENT AND PLAN:   45 year old female with OAB, PATRICE, and stage 3 uterovaginal prolapse. She has an acute URI with rhinorrhea, sneezing, and head congestion.     Diagnoses:  #1 Uterovaginal prolapse, stage 3  #2 Stress urinary incontinence   #3 Upper respiratory infection (URI)    Plan:  1. Uterovaginal prolapse, PATRICE  - Reviewed her UDN results which demonstrated +PATRICE, normal MUCPs, and a normal EMG and voiding pattern.   - Discussed PATRICE surgical management options including periurethral bulking vs midurethral sling.   > She is interested in proceeding with the midurethral sling.   - Reviewed the risks, benefits and alternatives of midurethral sling. Risks include around a 1% risk of mesh complications or erosion, 10-30% risk of urinary retention, risk of bleeding, infection, 5% risk of perforating the bladder when placing the sling, and damage to surrounding organs including bladder, urethra, ureters, bowel and blood vessels, as well as risk of fistula formation. We discussed the postoperative voiding trial and plan based on the results of this trial. We discussed potential development or worsening of urgency incontinence, and potential need for sling lysis or removal in the future. We discussed an 92-95% cure or improvement rate with the midurethral sling. Discussed that midurethral sling does not address urgency incontinence specifically but some patients with mixed incontinence report improvement in urgency symptoms. She understands she may still need treatment for her urgency urinary incontinence after this procedure. Perioperative antibiotics will be administered. Pain control post op with alternating Tylenol and ibuprofen, with Oxycodone available for breakthrough pain. Recommend daily MiraLAX to avoid constipation postoperatively. The postoperative recovery course includes 6 weeks of no heavy lifting >10 pounds and pelvic rest (I.e. no tub  soaking, swimming, or penetrative intercourse).     - We reviewed the risks, benefits, procedure steps, expected outcomes, and postoperative recovery of the sacrocolpopexy. Counseled that the SCP involves placing a Y-shaped polypropylene mesh to the sacral ligament in her pelvis to suspend her pelvic organs. This surgery has a high success rate of 85%. Reviewed that there is a 2-4% risk of mesh exposure which if this occurs is not life-threatening, largely asymptomatic, and is not dangerous; if mesh exposure occurs we would place her on tv estrogen to help the epithelium grow over the exposed mesh. A small percentage of people who experience mesh erosion might need a subsequent mesh excision surgery. The general risk of surgery include bleeding, infection, and damage to surrounding structures with need for further surgery. There is a 10-30% risk of experiencing POUR for which she would be temporarily catheterized until she passes her voiding trial if she fails her void trial in the PACU. After surgery she will need to be on pelvic rest with no heavy lifting over 10 pounds x6 weeks and we recommend no driving the first 1-2 weeks after surgery until she stops narcotics for postoperative pain control. We stressed the importance of preventing/managing constipation as straining to have a bowel movement puts her at higher risk of experiencing recurrent prolapse or undoing her sutures - encouraged her to start MiraLAX daily in a postoperative setting. She might experience pain around the site of where the abdominal port was placed during surgery and her postoperative pain may be managed by alternating Tylenol/Ibuprofen and she will be given a small Rx of Oxycodone for breakthrough pain.   - Preoperative labs include CBC, BMP, and blood typing.   - Surgery scheduled to include a robotic-assisted CRISTÓBAL, robotic-assisted SCPXY, BS, +/- APR, midurethral sling, and cystoscopy with Dr. Holbrook @ [facility] on 6/20/2025.     2. URI  -  Recently began experiencing URI symptoms x4 days ago with sneezing, head congestion, and rhinorrhea but tested negative for Covid yesterday on 6/15/2025. However, no fever or coughing fits.   - Sent Rx to begin taking Azithromycin 500 mg po for a total of x3 days.     Follow up 2 weeks after surgery with Dr. Holbrook for her first postoperative visit.     Telehealth visit time: 12 minutes     Scribe Attestation  By signing my name below, I, Rad Farrell, Scribe, attest that this documentation has been prepared under the direction and in the presence of Dana Holbrook MD on 06/16/2025 at 11:41 PM.     Agree with above. I Dr. Holbrook, personally performed the services described in the documentation which was scribed virtually and confirm it is both complete and accurate.  Dana Holbrook MD      Problem List Items Addressed This Visit    None  Visit Diagnoses         Upper respiratory tract infection, unspecified type    -  Primary    Relevant Medications    azithromycin (Zithromax) 500 mg tablet                I spent a total of 17 minutes in face to face and non face to face time at this virtual visit.          Dana Holbrook MD    Virtual or Telephone Consent:   An interactive audio and video telecommunication system which permits real time communications between the patient (at the originating site) and provider (at the distant site) was utilized to provide this telehealth service.   Verbal consent was requested and obtained from Chanda Licona on this date, 06/16/25 for a telehealth visit and the patient's location was confirmed at the time of the visit.      HISTORY OF PRESENT ILLNESS:   45 year old female presenting in virtual visit follow up to discuss UDN results and to finalize the surgical plan.     Records Review:   - Last visit 11/2024  Diagnoses:  #1 Stage 3 uterovaginal prolapse  #2 Overactive bladder     Plan:  Stage 3 uterovaginal prolapse  - She has a hx of unsuccessful trying a pessary  for prolapse. Unable to retain the pessary in past due to POP size.  - She is considering surgical intervention for prolapse, as she is becoming more bothered by the bulge being down and needing to push it up while at work.  - Plan to perform robotic scpxy/RSCH to address POP.  - Schedule UDS prior to the surgery.  - Ordered UDS.  - Will consider placing a sling during surgery to address PATRICE.  - Discussed potential recovery time and options for returning to work.  - Plan for surgery over the summer, with a tentative date for after June 24.     2. OAB  - Continue Vesicare 5 mg once daily at night time.     Follow-up with Dr. Holbrook post-UDN to discuss results and finalize surgical plans.     >> UDN: +PATRICE, normal MUCPs, normal voiding pattern and EMG     Urinary Symptoms:   - The patient is interested in scheduling a midurethral sling to reduce the risk of occult PATRICE leakage.   - She is amenable to proceeding with her previously scheduled SCPXY on 6/20/2025.     Respiratory Symptoms:  - Recently began experiencing URI symptoms x4 days ago with sneezing, head congestion, and rhinorrhea but tested negative for Covid yesterday on 6/15/2025.   - Denies coughing fits or fevers.   - Patient is interested in trying a Z-pack to help resolve her symptoms before her scheduled surgery.       Past Medical History:     Medical History[1]       Past Surgical History:     Surgical History[2]      Medications:     Prior to Admission medications    Not on File        ROS  Review of Systems   Constitutional: Negative.    HENT:  Positive for congestion, rhinorrhea and sneezing.    Eyes: Negative.    Respiratory: Negative.     Cardiovascular: Negative.    Gastrointestinal: Negative.    Endocrine: Negative.    Genitourinary:  Positive for enuresis.   Musculoskeletal: Negative.    Neurological: Negative.    Psychiatric/Behavioral: Negative.            Data and DIAGNOSTIC STUDIES REVIEWED   Imaging  No results found.    Cardiology,  "Vascular, and Other Imaging  No other imaging results found for the past 7 days     No results found for: \"URINECULTURE\", \"UAMICCOMM\"   Lab Results   Component Value Date    GLUCOSE 92 2025    CALCIUM 8.9 2025     2025    K 4.0 2025    CO2 23 2025     2025    BUN 12 2025    CREATININE 0.85 2025     Lab Results   Component Value Date    WBC 8.4 2025    HGB 13.4 2025    HCT 41.2 2025    MCV 86.6 2025     2025             [1]   Past Medical History:  Diagnosis Date    Personal history of other diseases of the nervous system and sense organs     History of migraine    Unspecified asthma, uncomplicated ([facility])     Asthma, acute   [2]   Past Surgical History:  Procedure Laterality Date     SECTION, CLASSIC  10/25/2016     Section     "

## 2025-06-16 ENCOUNTER — TELEMEDICINE (OUTPATIENT)
Dept: OBSTETRICS AND GYNECOLOGY | Facility: CLINIC | Age: 45
End: 2025-06-16
Payer: COMMERCIAL

## 2025-06-16 DIAGNOSIS — J06.9 UPPER RESPIRATORY TRACT INFECTION, UNSPECIFIED TYPE: Primary | ICD-10-CM

## 2025-06-16 PROCEDURE — 99212 OFFICE O/P EST SF 10 MIN: CPT | Performed by: OBSTETRICS & GYNECOLOGY

## 2025-06-16 RX ORDER — AZITHROMYCIN 500 MG/1
500 TABLET, FILM COATED ORAL DAILY
Qty: 3 TABLET | Refills: 0 | Status: SHIPPED | OUTPATIENT
Start: 2025-06-16 | End: 2025-06-21 | Stop reason: HOSPADM

## 2025-06-16 ASSESSMENT — ENCOUNTER SYMPTOMS
EYES NEGATIVE: 1
MUSCULOSKELETAL NEGATIVE: 1
RESPIRATORY NEGATIVE: 1
CARDIOVASCULAR NEGATIVE: 1
PSYCHIATRIC NEGATIVE: 1
RHINORRHEA: 1
NEUROLOGICAL NEGATIVE: 1
ENDOCRINE NEGATIVE: 1
GASTROINTESTINAL NEGATIVE: 1
CONSTITUTIONAL NEGATIVE: 1

## 2025-06-18 ENCOUNTER — LAB (OUTPATIENT)
Dept: LAB | Facility: HOSPITAL | Age: 45
End: 2025-06-18
Payer: COMMERCIAL

## 2025-06-18 DIAGNOSIS — N81.2 UTEROVAGINAL PROLAPSE, INCOMPLETE: ICD-10-CM

## 2025-06-18 DIAGNOSIS — N81.2 INCOMPLETE UTEROVAGINAL PROLAPSE: Primary | ICD-10-CM

## 2025-06-18 DIAGNOSIS — N39.3 STRESS INCONTINENCE: ICD-10-CM

## 2025-06-18 LAB
ABO GROUP (TYPE) IN BLOOD: NORMAL
ANION GAP SERPL CALC-SCNC: 13 MMOL/L (ref 10–20)
ANTIBODY SCREEN: NORMAL
BUN SERPL-MCNC: 12 MG/DL (ref 6–23)
CALCIUM SERPL-MCNC: 9 MG/DL (ref 8.6–10.6)
CHLORIDE SERPL-SCNC: 104 MMOL/L (ref 98–107)
CO2 SERPL-SCNC: 27 MMOL/L (ref 21–32)
CREAT SERPL-MCNC: 0.58 MG/DL (ref 0.5–1.05)
EGFRCR SERPLBLD CKD-EPI 2021: >90 ML/MIN/1.73M*2
ERYTHROCYTE [DISTWIDTH] IN BLOOD BY AUTOMATED COUNT: 13.1 % (ref 11.5–14.5)
GLUCOSE SERPL-MCNC: 116 MG/DL (ref 74–99)
HCT VFR BLD AUTO: 42.8 % (ref 36–46)
HGB BLD-MCNC: 13.3 G/DL (ref 12–16)
MCH RBC QN AUTO: 28.3 PG (ref 26–34)
MCHC RBC AUTO-ENTMCNC: 31.1 G/DL (ref 32–36)
MCV RBC AUTO: 91 FL (ref 80–100)
NRBC BLD-RTO: 0 /100 WBCS (ref 0–0)
PLATELET # BLD AUTO: 245 X10*3/UL (ref 150–450)
POTASSIUM SERPL-SCNC: 4.3 MMOL/L (ref 3.5–5.3)
RBC # BLD AUTO: 4.7 X10*6/UL (ref 4–5.2)
RH FACTOR (ANTIGEN D): NORMAL
SODIUM SERPL-SCNC: 140 MMOL/L (ref 136–145)
WBC # BLD AUTO: 6.6 X10*3/UL (ref 4.4–11.3)

## 2025-06-18 PROCEDURE — 86900 BLOOD TYPING SEROLOGIC ABO: CPT

## 2025-06-18 PROCEDURE — 85027 COMPLETE CBC AUTOMATED: CPT

## 2025-06-18 PROCEDURE — 36415 COLL VENOUS BLD VENIPUNCTURE: CPT

## 2025-06-18 PROCEDURE — 86850 RBC ANTIBODY SCREEN: CPT

## 2025-06-18 PROCEDURE — 80048 BASIC METABOLIC PNL TOTAL CA: CPT

## 2025-06-18 PROCEDURE — 86901 BLOOD TYPING SEROLOGIC RH(D): CPT

## 2025-06-19 ENCOUNTER — ANESTHESIA EVENT (OUTPATIENT)
Dept: OPERATING ROOM | Facility: HOSPITAL | Age: 45
End: 2025-06-19
Payer: COMMERCIAL

## 2025-06-19 NOTE — PROGRESS NOTES
Patient ID: Chanda Licona is a 45 y.o. female.    Uroflowmetry    Date/Time: 6/11/2025 8:46 AM    Performed by: Elda Rubio LPN  Authorized by: Dana Holbrook MD    Procedure discussed: discussed risks, benefits and alternatives    Chaperone present: no    Timeout: timeout called immediately prior to procedure      Procedure Details     Procedure: CMG with UPP/voiding pressures, EMG, intra-abdominal voiding study and uroflow      CMG with UPP/Voiding Pressures Details:     First urge to void (mL): 151    Urgency to void (mL): 175    Bladder capacity (mL): 286    Intra-abdominal Voiding Study Details:     Rectal catheter placed to record intra-abdominal pressure: yes      Uroflow Details:     Pre-void volume (mL): 64.2    Voiding duration (sec): 15.5    Average flow rate (mL/sec): 6    Max flow rate (mL/sec): 11.3    Voided urine (mL): 305    Residual urine (mL): 0    Post-Procedure Details     Outcome: patient tolerated procedure well with no complications      Additional Details      Positive for stress incontinence.    +PATRICE with normal MUCPS and normal EMG and voiding. Plan for sling.  Dana Holbrook MD

## 2025-06-20 ENCOUNTER — PHARMACY VISIT (OUTPATIENT)
Dept: PHARMACY | Facility: CLINIC | Age: 45
End: 2025-06-20
Payer: COMMERCIAL

## 2025-06-20 ENCOUNTER — ANESTHESIA (OUTPATIENT)
Dept: OPERATING ROOM | Facility: HOSPITAL | Age: 45
End: 2025-06-20
Payer: COMMERCIAL

## 2025-06-20 ENCOUNTER — HOSPITAL ENCOUNTER (OUTPATIENT)
Facility: HOSPITAL | Age: 45
Discharge: HOME | End: 2025-06-21
Attending: OBSTETRICS & GYNECOLOGY | Admitting: OBSTETRICS & GYNECOLOGY
Payer: COMMERCIAL

## 2025-06-20 DIAGNOSIS — N39.3 STRESS INCONTINENCE: ICD-10-CM

## 2025-06-20 DIAGNOSIS — N81.9 FEMALE GENITAL PROLAPSE, UNSPECIFIED TYPE: Primary | ICD-10-CM

## 2025-06-20 DIAGNOSIS — R11.0 POSTOPERATIVE NAUSEA: ICD-10-CM

## 2025-06-20 DIAGNOSIS — N81.2 UTEROVAGINAL PROLAPSE, INCOMPLETE: ICD-10-CM

## 2025-06-20 DIAGNOSIS — Z98.890 POSTOPERATIVE NAUSEA: ICD-10-CM

## 2025-06-20 PROBLEM — G89.18 POSTOPERATIVE PAIN: Status: ACTIVE | Noted: 2025-06-20

## 2025-06-20 PROBLEM — J06.9 RECENT URI: Status: ACTIVE | Noted: 2025-06-20

## 2025-06-20 PROBLEM — R11.2 POSTOPERATIVE NAUSEA AND VOMITING: Status: ACTIVE | Noted: 2025-06-20

## 2025-06-20 LAB — PREGNANCY TEST URINE, POC: NEGATIVE

## 2025-06-20 PROCEDURE — 3700000001 HC GENERAL ANESTHESIA TIME - INITIAL BASE CHARGE: Performed by: OBSTETRICS & GYNECOLOGY

## 2025-06-20 PROCEDURE — C1771 REP DEV, URINARY, W/SLING: HCPCS | Performed by: OBSTETRICS & GYNECOLOGY

## 2025-06-20 PROCEDURE — 88304 TISSUE EXAM BY PATHOLOGIST: CPT | Performed by: PATHOLOGY

## 2025-06-20 PROCEDURE — 7100000010 HC PHASE TWO TIME - EACH INCREMENTAL 1 MINUTE: Performed by: OBSTETRICS & GYNECOLOGY

## 2025-06-20 PROCEDURE — 7100000009 HC PHASE TWO TIME - INITIAL BASE CHARGE: Performed by: OBSTETRICS & GYNECOLOGY

## 2025-06-20 PROCEDURE — 2500000004 HC RX 250 GENERAL PHARMACY W/ HCPCS (ALT 636 FOR OP/ED): Performed by: ANESTHESIOLOGIST ASSISTANT

## 2025-06-20 PROCEDURE — RXMED WILLOW AMBULATORY MEDICATION CHARGE

## 2025-06-20 PROCEDURE — 3600000017 HC OR TIME - EACH INCREMENTAL 1 MINUTE - PROCEDURE LEVEL SIX: Performed by: OBSTETRICS & GYNECOLOGY

## 2025-06-20 PROCEDURE — 57425 LAPAROSCOPY SURG COLPOPEXY: CPT | Performed by: OBSTETRICS & GYNECOLOGY

## 2025-06-20 PROCEDURE — 88307 TISSUE EXAM BY PATHOLOGIST: CPT | Mod: TC,AHULAB | Performed by: OBSTETRICS & GYNECOLOGY

## 2025-06-20 PROCEDURE — 2500000001 HC RX 250 WO HCPCS SELF ADMINISTERED DRUGS (ALT 637 FOR MEDICARE OP): Performed by: OBSTETRICS & GYNECOLOGY

## 2025-06-20 PROCEDURE — 88307 TISSUE EXAM BY PATHOLOGIST: CPT | Performed by: PATHOLOGY

## 2025-06-20 PROCEDURE — 2500000005 HC RX 250 GENERAL PHARMACY W/O HCPCS: Performed by: ANESTHESIOLOGIST ASSISTANT

## 2025-06-20 PROCEDURE — 7100000002 HC RECOVERY ROOM TIME - EACH INCREMENTAL 1 MINUTE: Performed by: OBSTETRICS & GYNECOLOGY

## 2025-06-20 PROCEDURE — 2500000004 HC RX 250 GENERAL PHARMACY W/ HCPCS (ALT 636 FOR OP/ED): Performed by: STUDENT IN AN ORGANIZED HEALTH CARE EDUCATION/TRAINING PROGRAM

## 2025-06-20 PROCEDURE — 81025 URINE PREGNANCY TEST: CPT | Performed by: ANESTHESIOLOGY

## 2025-06-20 PROCEDURE — C1781 MESH (IMPLANTABLE): HCPCS | Performed by: OBSTETRICS & GYNECOLOGY

## 2025-06-20 PROCEDURE — 58542 LSH W/T/O UT 250 G OR LESS: CPT | Performed by: OBSTETRICS & GYNECOLOGY

## 2025-06-20 PROCEDURE — 3600000018 HC OR TIME - INITIAL BASE CHARGE - PROCEDURE LEVEL SIX: Performed by: OBSTETRICS & GYNECOLOGY

## 2025-06-20 PROCEDURE — 2500000004 HC RX 250 GENERAL PHARMACY W/ HCPCS (ALT 636 FOR OP/ED): Performed by: OBSTETRICS & GYNECOLOGY

## 2025-06-20 PROCEDURE — 2720000007 HC OR 272 NO HCPCS: Performed by: OBSTETRICS & GYNECOLOGY

## 2025-06-20 PROCEDURE — 2500000004 HC RX 250 GENERAL PHARMACY W/ HCPCS (ALT 636 FOR OP/ED): Performed by: ANESTHESIOLOGY

## 2025-06-20 PROCEDURE — 2780000003 HC OR 278 NO HCPCS: Performed by: OBSTETRICS & GYNECOLOGY

## 2025-06-20 PROCEDURE — 57250 REPAIR RECTUM & VAGINA: CPT | Performed by: OBSTETRICS & GYNECOLOGY

## 2025-06-20 PROCEDURE — 57288 REPAIR BLADDER DEFECT: CPT | Performed by: OBSTETRICS & GYNECOLOGY

## 2025-06-20 PROCEDURE — 2500000005 HC RX 250 GENERAL PHARMACY W/O HCPCS: Performed by: ANESTHESIOLOGY

## 2025-06-20 PROCEDURE — 7100000011 HC EXTENDED STAY RECOVERY HOURLY - NURSING UNIT

## 2025-06-20 PROCEDURE — 7100000001 HC RECOVERY ROOM TIME - INITIAL BASE CHARGE: Performed by: OBSTETRICS & GYNECOLOGY

## 2025-06-20 PROCEDURE — 2500000005 HC RX 250 GENERAL PHARMACY W/O HCPCS: Performed by: OBSTETRICS & GYNECOLOGY

## 2025-06-20 PROCEDURE — 3700000002 HC GENERAL ANESTHESIA TIME - EACH INCREMENTAL 1 MINUTE: Performed by: OBSTETRICS & GYNECOLOGY

## 2025-06-20 PROCEDURE — 2500000001 HC RX 250 WO HCPCS SELF ADMINISTERED DRUGS (ALT 637 FOR MEDICARE OP): Performed by: STUDENT IN AN ORGANIZED HEALTH CARE EDUCATION/TRAINING PROGRAM

## 2025-06-20 PROCEDURE — S2900 ROBOTIC SURGICAL SYSTEM: HCPCS | Performed by: OBSTETRICS & GYNECOLOGY

## 2025-06-20 PROCEDURE — 96372 THER/PROPH/DIAG INJ SC/IM: CPT | Performed by: STUDENT IN AN ORGANIZED HEALTH CARE EDUCATION/TRAINING PROGRAM

## 2025-06-20 DEVICE — IMPLANTABLE DEVICE: Type: IMPLANTABLE DEVICE | Site: ABDOMEN | Status: FUNCTIONAL

## 2025-06-20 DEVICE — TRANSVAGINAL MID-URETHRAL SLING
Type: IMPLANTABLE DEVICE | Site: PELVIS | Status: FUNCTIONAL
Brand: ADVANTAGE FIT™ BLUE SYSTEM

## 2025-06-20 RX ORDER — MIDAZOLAM HYDROCHLORIDE 1 MG/ML
INJECTION INTRAMUSCULAR; INTRAVENOUS AS NEEDED
Status: DISCONTINUED | OUTPATIENT
Start: 2025-06-20 | End: 2025-06-20

## 2025-06-20 RX ORDER — HYDROMORPHONE HYDROCHLORIDE 1 MG/ML
INJECTION, SOLUTION INTRAMUSCULAR; INTRAVENOUS; SUBCUTANEOUS AS NEEDED
Status: DISCONTINUED | OUTPATIENT
Start: 2025-06-20 | End: 2025-06-20

## 2025-06-20 RX ORDER — SODIUM CHLORIDE, SODIUM LACTATE, POTASSIUM CHLORIDE, CALCIUM CHLORIDE 600; 310; 30; 20 MG/100ML; MG/100ML; MG/100ML; MG/100ML
125 INJECTION, SOLUTION INTRAVENOUS CONTINUOUS
Status: DISCONTINUED | OUTPATIENT
Start: 2025-06-20 | End: 2025-06-21 | Stop reason: HOSPADM

## 2025-06-20 RX ORDER — ACETAMINOPHEN 325 MG/1
975 TABLET ORAL ONCE
Status: COMPLETED | OUTPATIENT
Start: 2025-06-20 | End: 2025-06-20

## 2025-06-20 RX ORDER — MAGNESIUM SULFATE HEPTAHYDRATE 500 MG/ML
INJECTION, SOLUTION INTRAMUSCULAR; INTRAVENOUS AS NEEDED
Status: DISCONTINUED | OUTPATIENT
Start: 2025-06-20 | End: 2025-06-20

## 2025-06-20 RX ORDER — ACETAMINOPHEN 325 MG/1
975 TABLET ORAL EVERY 6 HOURS
Status: DISCONTINUED | OUTPATIENT
Start: 2025-06-20 | End: 2025-06-21 | Stop reason: HOSPADM

## 2025-06-20 RX ORDER — CELECOXIB 200 MG/1
400 CAPSULE ORAL ONCE
Status: COMPLETED | OUTPATIENT
Start: 2025-06-20 | End: 2025-06-20

## 2025-06-20 RX ORDER — KETOROLAC TROMETHAMINE 30 MG/ML
30 INJECTION, SOLUTION INTRAMUSCULAR; INTRAVENOUS EVERY 6 HOURS
Status: DISCONTINUED | OUTPATIENT
Start: 2025-06-20 | End: 2025-06-21 | Stop reason: HOSPADM

## 2025-06-20 RX ORDER — OXYCODONE HYDROCHLORIDE 5 MG/1
5 TABLET ORAL
Status: DISCONTINUED | OUTPATIENT
Start: 2025-06-20 | End: 2025-06-20 | Stop reason: HOSPADM

## 2025-06-20 RX ORDER — LIDOCAINE HYDROCHLORIDE 20 MG/ML
INJECTION, SOLUTION INFILTRATION; PERINEURAL AS NEEDED
Status: DISCONTINUED | OUTPATIENT
Start: 2025-06-20 | End: 2025-06-20

## 2025-06-20 RX ORDER — IBUPROFEN 600 MG/1
600 TABLET, FILM COATED ORAL EVERY 6 HOURS
Qty: 28 TABLET | Refills: 0 | Status: SHIPPED | OUTPATIENT
Start: 2025-06-20 | End: 2025-06-27

## 2025-06-20 RX ORDER — GABAPENTIN 300 MG/1
600 CAPSULE ORAL ONCE
Status: COMPLETED | OUTPATIENT
Start: 2025-06-20 | End: 2025-06-20

## 2025-06-20 RX ORDER — ONDANSETRON HYDROCHLORIDE 2 MG/ML
INJECTION, SOLUTION INTRAVENOUS AS NEEDED
Status: DISCONTINUED | OUTPATIENT
Start: 2025-06-20 | End: 2025-06-20

## 2025-06-20 RX ORDER — WATER 1 ML/ML
INJECTION IRRIGATION AS NEEDED
Status: DISCONTINUED | OUTPATIENT
Start: 2025-06-20 | End: 2025-06-20 | Stop reason: HOSPADM

## 2025-06-20 RX ORDER — SODIUM CHLORIDE 0.9 G/100ML
INJECTION, SOLUTION IRRIGATION AS NEEDED
Status: DISCONTINUED | OUTPATIENT
Start: 2025-06-20 | End: 2025-06-20 | Stop reason: HOSPADM

## 2025-06-20 RX ORDER — PHENAZOPYRIDINE HYDROCHLORIDE 100 MG/1
200 TABLET, FILM COATED ORAL ONCE
Status: COMPLETED | OUTPATIENT
Start: 2025-06-20 | End: 2025-06-20

## 2025-06-20 RX ORDER — SUCCINYLCHOLINE CHLORIDE 20 MG/ML
INJECTION INTRAMUSCULAR; INTRAVENOUS AS NEEDED
Status: DISCONTINUED | OUTPATIENT
Start: 2025-06-20 | End: 2025-06-20

## 2025-06-20 RX ORDER — ONDANSETRON 4 MG/1
4 TABLET, ORALLY DISINTEGRATING ORAL EVERY 8 HOURS PRN
Qty: 20 TABLET | Refills: 0 | Status: SHIPPED | OUTPATIENT
Start: 2025-06-20

## 2025-06-20 RX ORDER — ONDANSETRON 4 MG/1
4 TABLET, ORALLY DISINTEGRATING ORAL EVERY 8 HOURS PRN
Qty: 20 TABLET | Refills: 1 | Status: SHIPPED | OUTPATIENT
Start: 2025-06-20 | End: 2025-06-21 | Stop reason: HOSPADM

## 2025-06-20 RX ORDER — CEFAZOLIN SODIUM 2 G/50ML
2 SOLUTION INTRAVENOUS ONCE
Status: CANCELLED | OUTPATIENT
Start: 2025-06-20

## 2025-06-20 RX ORDER — DROPERIDOL 2.5 MG/ML
0.62 INJECTION, SOLUTION INTRAMUSCULAR; INTRAVENOUS ONCE AS NEEDED
Status: DISCONTINUED | OUTPATIENT
Start: 2025-06-20 | End: 2025-06-20 | Stop reason: HOSPADM

## 2025-06-20 RX ORDER — PHENYLEPHRINE HCL IN 0.9% NACL 1 MG/10 ML
SYRINGE (ML) INTRAVENOUS AS NEEDED
Status: DISCONTINUED | OUTPATIENT
Start: 2025-06-20 | End: 2025-06-20

## 2025-06-20 RX ORDER — PROPOFOL 10 MG/ML
INJECTION, EMULSION INTRAVENOUS AS NEEDED
Status: DISCONTINUED | OUTPATIENT
Start: 2025-06-20 | End: 2025-06-20

## 2025-06-20 RX ORDER — BUPIVACAINE HYDROCHLORIDE 2.5 MG/ML
INJECTION, SOLUTION INFILTRATION; PERINEURAL AS NEEDED
Status: DISCONTINUED | OUTPATIENT
Start: 2025-06-20 | End: 2025-06-20 | Stop reason: HOSPADM

## 2025-06-20 RX ORDER — OXYCODONE HYDROCHLORIDE 5 MG/1
5 TABLET ORAL EVERY 4 HOURS PRN
Refills: 0 | Status: DISCONTINUED | OUTPATIENT
Start: 2025-06-20 | End: 2025-06-21 | Stop reason: HOSPADM

## 2025-06-20 RX ORDER — METRONIDAZOLE 500 MG/100ML
INJECTION, SOLUTION INTRAVENOUS AS NEEDED
Status: DISCONTINUED | OUTPATIENT
Start: 2025-06-20 | End: 2025-06-20

## 2025-06-20 RX ORDER — POLYETHYLENE GLYCOL 3350 17 G/17G
17 POWDER, FOR SOLUTION ORAL DAILY
Qty: 510 G | Refills: 0 | Status: SHIPPED | OUTPATIENT
Start: 2025-06-20

## 2025-06-20 RX ORDER — HYDRALAZINE HYDROCHLORIDE 20 MG/ML
5 INJECTION INTRAMUSCULAR; INTRAVENOUS EVERY 30 MIN PRN
Status: DISCONTINUED | OUTPATIENT
Start: 2025-06-20 | End: 2025-06-20 | Stop reason: HOSPADM

## 2025-06-20 RX ORDER — ONDANSETRON HYDROCHLORIDE 2 MG/ML
4 INJECTION, SOLUTION INTRAVENOUS ONCE AS NEEDED
Status: COMPLETED | OUTPATIENT
Start: 2025-06-20 | End: 2025-06-20

## 2025-06-20 RX ORDER — OXYCODONE HYDROCHLORIDE 5 MG/1
5 TABLET ORAL EVERY 6 HOURS PRN
Qty: 5 TABLET | Refills: 0 | Status: SHIPPED | OUTPATIENT
Start: 2025-06-20 | End: 2025-06-23

## 2025-06-20 RX ORDER — SIMETHICONE 80 MG
40 TABLET,CHEWABLE ORAL 4 TIMES DAILY PRN
Status: DISCONTINUED | OUTPATIENT
Start: 2025-06-20 | End: 2025-06-21 | Stop reason: HOSPADM

## 2025-06-20 RX ORDER — POLYETHYLENE GLYCOL 3350 17 G/17G
17 POWDER, FOR SOLUTION ORAL DAILY
Status: DISCONTINUED | OUTPATIENT
Start: 2025-06-20 | End: 2025-06-21 | Stop reason: HOSPADM

## 2025-06-20 RX ORDER — FENTANYL CITRATE 50 UG/ML
INJECTION, SOLUTION INTRAMUSCULAR; INTRAVENOUS AS NEEDED
Status: DISCONTINUED | OUTPATIENT
Start: 2025-06-20 | End: 2025-06-20

## 2025-06-20 RX ORDER — SODIUM CHLORIDE, SODIUM LACTATE, POTASSIUM CHLORIDE, CALCIUM CHLORIDE 600; 310; 30; 20 MG/100ML; MG/100ML; MG/100ML; MG/100ML
20 INJECTION, SOLUTION INTRAVENOUS CONTINUOUS
Status: ACTIVE | OUTPATIENT
Start: 2025-06-20 | End: 2025-06-20

## 2025-06-20 RX ORDER — LIDOCAINE HYDROCHLORIDE AND EPINEPHRINE 10; 10 UG/ML; MG/ML
INJECTION, SOLUTION INFILTRATION; PERINEURAL AS NEEDED
Status: DISCONTINUED | OUTPATIENT
Start: 2025-06-20 | End: 2025-06-20 | Stop reason: HOSPADM

## 2025-06-20 RX ORDER — SODIUM CHLORIDE, SODIUM LACTATE, POTASSIUM CHLORIDE, CALCIUM CHLORIDE 600; 310; 30; 20 MG/100ML; MG/100ML; MG/100ML; MG/100ML
100 INJECTION, SOLUTION INTRAVENOUS CONTINUOUS
Status: DISCONTINUED | OUTPATIENT
Start: 2025-06-20 | End: 2025-06-20 | Stop reason: HOSPADM

## 2025-06-20 RX ORDER — METOCLOPRAMIDE HYDROCHLORIDE 5 MG/ML
10 INJECTION INTRAMUSCULAR; INTRAVENOUS EVERY 6 HOURS PRN
Status: DISCONTINUED | OUTPATIENT
Start: 2025-06-20 | End: 2025-06-21 | Stop reason: HOSPADM

## 2025-06-20 RX ORDER — ENOXAPARIN SODIUM 100 MG/ML
40 INJECTION SUBCUTANEOUS ONCE
Status: COMPLETED | OUTPATIENT
Start: 2025-06-20 | End: 2025-06-20

## 2025-06-20 RX ORDER — CEFOXITIN 2 G/1
INJECTION, POWDER, FOR SOLUTION INTRAVENOUS AS NEEDED
Status: DISCONTINUED | OUTPATIENT
Start: 2025-06-20 | End: 2025-06-20

## 2025-06-20 RX ORDER — ROCURONIUM BROMIDE 10 MG/ML
INJECTION, SOLUTION INTRAVENOUS AS NEEDED
Status: DISCONTINUED | OUTPATIENT
Start: 2025-06-20 | End: 2025-06-20

## 2025-06-20 RX ORDER — ACETAMINOPHEN 500 MG
1000 TABLET ORAL EVERY 6 HOURS PRN
Qty: 30 TABLET | Refills: 0 | Status: SHIPPED | OUTPATIENT
Start: 2025-06-20

## 2025-06-20 RX ORDER — KETOROLAC TROMETHAMINE 30 MG/ML
INJECTION, SOLUTION INTRAMUSCULAR; INTRAVENOUS AS NEEDED
Status: DISCONTINUED | OUTPATIENT
Start: 2025-06-20 | End: 2025-06-20

## 2025-06-20 RX ORDER — METOCLOPRAMIDE 10 MG/1
10 TABLET ORAL EVERY 6 HOURS PRN
Status: DISCONTINUED | OUTPATIENT
Start: 2025-06-20 | End: 2025-06-21 | Stop reason: HOSPADM

## 2025-06-20 RX ADMIN — ENOXAPARIN SODIUM 40 MG: 40 INJECTION SUBCUTANEOUS at 20:54

## 2025-06-20 RX ADMIN — Medication 200 MCG: at 08:03

## 2025-06-20 RX ADMIN — FENTANYL CITRATE 100 MCG: 50 INJECTION, SOLUTION INTRAMUSCULAR; INTRAVENOUS at 07:56

## 2025-06-20 RX ADMIN — CELECOXIB 400 MG: 200 CAPSULE ORAL at 07:16

## 2025-06-20 RX ADMIN — ACETAMINOPHEN 975 MG: 325 TABLET ORAL at 18:43

## 2025-06-20 RX ADMIN — LIDOCAINE HYDROCHLORIDE 50 MG: 20 INJECTION, SOLUTION INFILTRATION; PERINEURAL at 07:54

## 2025-06-20 RX ADMIN — ONDANSETRON 4 MG: 2 INJECTION INTRAMUSCULAR; INTRAVENOUS at 14:45

## 2025-06-20 RX ADMIN — Medication 100 MCG: at 08:20

## 2025-06-20 RX ADMIN — ONDANSETRON 4 MG: 2 INJECTION INTRAMUSCULAR; INTRAVENOUS at 11:15

## 2025-06-20 RX ADMIN — HYDROMORPHONE HYDROCHLORIDE 0.4 MG: 1 INJECTION, SOLUTION INTRAMUSCULAR; INTRAVENOUS; SUBCUTANEOUS at 10:54

## 2025-06-20 RX ADMIN — DEXAMETHASONE SODIUM PHOSPHATE 10 MG: 4 INJECTION, SOLUTION INTRAMUSCULAR; INTRAVENOUS at 08:15

## 2025-06-20 RX ADMIN — Medication 100 MCG: at 08:11

## 2025-06-20 RX ADMIN — PROPOFOL 200 MG: 10 INJECTION, EMULSION INTRAVENOUS at 07:54

## 2025-06-20 RX ADMIN — HYDROMORPHONE HYDROCHLORIDE 0.6 MG: 1 INJECTION, SOLUTION INTRAMUSCULAR; INTRAVENOUS; SUBCUTANEOUS at 11:34

## 2025-06-20 RX ADMIN — MAGNESIUM SULFATE HEPTAHYDRATE 2 G: 500 INJECTION, SOLUTION INTRAMUSCULAR; INTRAVENOUS at 08:15

## 2025-06-20 RX ADMIN — Medication 6 L/MIN: at 11:51

## 2025-06-20 RX ADMIN — KETOROLAC TROMETHAMINE 30 MG: 30 INJECTION, SOLUTION INTRAMUSCULAR at 11:15

## 2025-06-20 RX ADMIN — Medication 10 MG: at 10:06

## 2025-06-20 RX ADMIN — SUCCINYLCHOLINE CHLORIDE 120 MG: 20 INJECTION, SOLUTION INTRAMUSCULAR; INTRAVENOUS at 07:54

## 2025-06-20 RX ADMIN — PROPOFOL 45 MG: 10 INJECTION, EMULSION INTRAVENOUS at 11:24

## 2025-06-20 RX ADMIN — ROCURONIUM BROMIDE 60 MG: 10 INJECTION, SOLUTION INTRAVENOUS at 08:00

## 2025-06-20 RX ADMIN — PROPOFOL 50 MCG/KG/MIN: 10 INJECTION, EMULSION INTRAVENOUS at 08:10

## 2025-06-20 RX ADMIN — METRONIDAZOLE 500 MG: 500 SOLUTION INTRAVENOUS at 08:05

## 2025-06-20 RX ADMIN — CEFOXITIN SODIUM 2 G: 2 POWDER, FOR SOLUTION INTRAVENOUS at 08:05

## 2025-06-20 RX ADMIN — ROCURONIUM BROMIDE 20 MG: 10 INJECTION, SOLUTION INTRAVENOUS at 09:13

## 2025-06-20 RX ADMIN — ROCURONIUM BROMIDE 20 MG: 10 INJECTION, SOLUTION INTRAVENOUS at 10:01

## 2025-06-20 RX ADMIN — ACETAMINOPHEN 975 MG: 325 TABLET, FILM COATED ORAL at 07:15

## 2025-06-20 RX ADMIN — PHENAZOPYRIDINE 200 MG: 100 TABLET ORAL at 07:15

## 2025-06-20 RX ADMIN — SIMETHICONE 40 MG: 80 TABLET, CHEWABLE ORAL at 23:04

## 2025-06-20 RX ADMIN — KETOROLAC TROMETHAMINE 30 MG: 30 INJECTION, SOLUTION INTRAMUSCULAR at 23:04

## 2025-06-20 RX ADMIN — Medication 20 MG: at 08:18

## 2025-06-20 RX ADMIN — Medication 10 MG: at 11:01

## 2025-06-20 RX ADMIN — Medication 10 MG: at 09:13

## 2025-06-20 RX ADMIN — POLYETHYLENE GLYCOL 3350 17 G: 17 POWDER, FOR SOLUTION ORAL at 21:02

## 2025-06-20 RX ADMIN — GABAPENTIN 600 MG: 300 CAPSULE ORAL at 07:15

## 2025-06-20 RX ADMIN — MIDAZOLAM HYDROCHLORIDE 2 MG: 1 INJECTION, SOLUTION INTRAMUSCULAR; INTRAVENOUS at 07:41

## 2025-06-20 RX ADMIN — SUGAMMADEX 200 MG: 100 INJECTION, SOLUTION INTRAVENOUS at 11:40

## 2025-06-20 RX ADMIN — SODIUM CHLORIDE, POTASSIUM CHLORIDE, SODIUM LACTATE AND CALCIUM CHLORIDE: 600; 310; 30; 20 INJECTION, SOLUTION INTRAVENOUS at 07:27

## 2025-06-20 SDOH — ECONOMIC STABILITY: HOUSING INSECURITY: IN THE LAST 12 MONTHS, WAS THERE A TIME WHEN YOU WERE NOT ABLE TO PAY THE MORTGAGE OR RENT ON TIME?: NO

## 2025-06-20 SDOH — ECONOMIC STABILITY: HOUSING INSECURITY: IN THE PAST 12 MONTHS, HOW MANY TIMES HAVE YOU MOVED WHERE YOU WERE LIVING?: 0

## 2025-06-20 SDOH — SOCIAL STABILITY: SOCIAL INSECURITY
WITHIN THE LAST YEAR, HAVE YOU BEEN RAPED OR FORCED TO HAVE ANY KIND OF SEXUAL ACTIVITY BY YOUR PARTNER OR EX-PARTNER?: NO

## 2025-06-20 SDOH — ECONOMIC STABILITY: FOOD INSECURITY: WITHIN THE PAST 12 MONTHS, YOU WORRIED THAT YOUR FOOD WOULD RUN OUT BEFORE YOU GOT THE MONEY TO BUY MORE.: NEVER TRUE

## 2025-06-20 SDOH — ECONOMIC STABILITY: HOUSING INSECURITY: AT ANY TIME IN THE PAST 12 MONTHS, WERE YOU HOMELESS OR LIVING IN A SHELTER (INCLUDING NOW)?: NO

## 2025-06-20 SDOH — ECONOMIC STABILITY: INCOME INSECURITY: IN THE PAST 12 MONTHS HAS THE ELECTRIC, GAS, OIL, OR WATER COMPANY THREATENED TO SHUT OFF SERVICES IN YOUR HOME?: NO

## 2025-06-20 SDOH — ECONOMIC STABILITY: FOOD INSECURITY: WITHIN THE PAST 12 MONTHS, THE FOOD YOU BOUGHT JUST DIDN'T LAST AND YOU DIDN'T HAVE MONEY TO GET MORE.: NEVER TRUE

## 2025-06-20 SDOH — SOCIAL STABILITY: SOCIAL INSECURITY
WITHIN THE LAST YEAR, HAVE YOU BEEN KICKED, HIT, SLAPPED, OR OTHERWISE PHYSICALLY HURT BY YOUR PARTNER OR EX-PARTNER?: NO

## 2025-06-20 SDOH — SOCIAL STABILITY: SOCIAL INSECURITY: DO YOU FEEL UNSAFE GOING BACK TO THE PLACE WHERE YOU ARE LIVING?: NO

## 2025-06-20 SDOH — SOCIAL STABILITY: SOCIAL INSECURITY: HAVE YOU HAD THOUGHTS OF HARMING ANYONE ELSE?: NO

## 2025-06-20 SDOH — SOCIAL STABILITY: SOCIAL INSECURITY: WITHIN THE LAST YEAR, HAVE YOU BEEN HUMILIATED OR EMOTIONALLY ABUSED IN OTHER WAYS BY YOUR PARTNER OR EX-PARTNER?: NO

## 2025-06-20 SDOH — ECONOMIC STABILITY: FOOD INSECURITY: HOW HARD IS IT FOR YOU TO PAY FOR THE VERY BASICS LIKE FOOD, HOUSING, MEDICAL CARE, AND HEATING?: NOT HARD AT ALL

## 2025-06-20 SDOH — SOCIAL STABILITY: SOCIAL INSECURITY: DOES ANYONE TRY TO KEEP YOU FROM HAVING/CONTACTING OTHER FRIENDS OR DOING THINGS OUTSIDE YOUR HOME?: NO

## 2025-06-20 SDOH — SOCIAL STABILITY: SOCIAL INSECURITY: HAS ANYONE EVER THREATENED TO HURT YOUR FAMILY OR YOUR PETS?: NO

## 2025-06-20 SDOH — SOCIAL STABILITY: SOCIAL INSECURITY: HAVE YOU HAD ANY THOUGHTS OF HARMING ANYONE ELSE?: NO

## 2025-06-20 SDOH — SOCIAL STABILITY: SOCIAL INSECURITY: WITHIN THE LAST YEAR, HAVE YOU BEEN AFRAID OF YOUR PARTNER OR EX-PARTNER?: NO

## 2025-06-20 SDOH — ECONOMIC STABILITY: TRANSPORTATION INSECURITY: IN THE PAST 12 MONTHS, HAS LACK OF TRANSPORTATION KEPT YOU FROM MEDICAL APPOINTMENTS OR FROM GETTING MEDICATIONS?: NO

## 2025-06-20 SDOH — SOCIAL STABILITY: SOCIAL INSECURITY: ARE YOU OR HAVE YOU BEEN THREATENED OR ABUSED PHYSICALLY, EMOTIONALLY, OR SEXUALLY BY ANYONE?: NO

## 2025-06-20 SDOH — SOCIAL STABILITY: SOCIAL INSECURITY: ABUSE: ADULT

## 2025-06-20 SDOH — SOCIAL STABILITY: SOCIAL INSECURITY: ARE THERE ANY APPARENT SIGNS OF INJURIES/BEHAVIORS THAT COULD BE RELATED TO ABUSE/NEGLECT?: NO

## 2025-06-20 SDOH — SOCIAL STABILITY: SOCIAL INSECURITY: WERE YOU ABLE TO COMPLETE ALL THE BEHAVIORAL HEALTH SCREENINGS?: YES

## 2025-06-20 SDOH — HEALTH STABILITY: MENTAL HEALTH: CURRENT SMOKER: 0

## 2025-06-20 SDOH — SOCIAL STABILITY: SOCIAL INSECURITY: DO YOU FEEL ANYONE HAS EXPLOITED OR TAKEN ADVANTAGE OF YOU FINANCIALLY OR OF YOUR PERSONAL PROPERTY?: NO

## 2025-06-20 ASSESSMENT — COGNITIVE AND FUNCTIONAL STATUS - GENERAL
DAILY ACTIVITIY SCORE: 20
DRESSING REGULAR LOWER BODY CLOTHING: A LITTLE
HELP NEEDED FOR BATHING: A LITTLE
HELP NEEDED FOR BATHING: A LITTLE
DRESSING REGULAR UPPER BODY CLOTHING: A LITTLE
MOBILITY SCORE: 24
MOBILITY SCORE: 24
DAILY ACTIVITIY SCORE: 22
PERSONAL GROOMING: A LITTLE
PATIENT BASELINE BEDBOUND: NO
DRESSING REGULAR LOWER BODY CLOTHING: A LITTLE

## 2025-06-20 ASSESSMENT — ENCOUNTER SYMPTOMS
ABDOMINAL DISTENTION: 0
SHORTNESS OF BREATH: 0
NEUROLOGICAL NEGATIVE: 1
MYALGIAS: 0
ARTHRALGIAS: 0
COUGH: 0
VOMITING: 0
TROUBLE SWALLOWING: 0
ABDOMINAL PAIN: 0
FEVER: 0
PSYCHIATRIC NEGATIVE: 1
NAUSEA: 0
CHILLS: 0
DYSURIA: 0

## 2025-06-20 ASSESSMENT — ACTIVITIES OF DAILY LIVING (ADL)
HEARING - RIGHT EAR: FUNCTIONAL
GROOMING: INDEPENDENT
LACK_OF_TRANSPORTATION: NO
TOILETING: INDEPENDENT
BATHING: INDEPENDENT
HEARING - LEFT EAR: FUNCTIONAL
WALKS IN HOME: INDEPENDENT
ADEQUATE_TO_COMPLETE_ADL: YES
LACK_OF_TRANSPORTATION: NO
PATIENT'S MEMORY ADEQUATE TO SAFELY COMPLETE DAILY ACTIVITIES?: YES
JUDGMENT_ADEQUATE_SAFELY_COMPLETE_DAILY_ACTIVITIES: YES
FEEDING YOURSELF: INDEPENDENT
DRESSING YOURSELF: INDEPENDENT

## 2025-06-20 ASSESSMENT — PAIN SCALES - GENERAL
PAINLEVEL_OUTOF10: 2
PAINLEVEL_OUTOF10: 0 - NO PAIN
PAINLEVEL_OUTOF10: 2
PAINLEVEL_OUTOF10: 2
PAINLEVEL_OUTOF10: 0 - NO PAIN
PAINLEVEL_OUTOF10: 2
PAINLEVEL_OUTOF10: 1
PAINLEVEL_OUTOF10: 2

## 2025-06-20 ASSESSMENT — PATIENT HEALTH QUESTIONNAIRE - PHQ9
2. FEELING DOWN, DEPRESSED OR HOPELESS: NOT AT ALL
1. LITTLE INTEREST OR PLEASURE IN DOING THINGS: NOT AT ALL
SUM OF ALL RESPONSES TO PHQ9 QUESTIONS 1 & 2: 0

## 2025-06-20 ASSESSMENT — PAIN DESCRIPTION - DESCRIPTORS
DESCRIPTORS: BURNING
DESCRIPTORS: CRAMPING
DESCRIPTORS: BURNING

## 2025-06-20 ASSESSMENT — PAIN - FUNCTIONAL ASSESSMENT
PAIN_FUNCTIONAL_ASSESSMENT: 0-10
PAIN_FUNCTIONAL_ASSESSMENT: UNABLE TO SELF-REPORT
PAIN_FUNCTIONAL_ASSESSMENT: 0-10
PAIN_FUNCTIONAL_ASSESSMENT: 0-10
PAIN_FUNCTIONAL_ASSESSMENT: UNABLE TO SELF-REPORT
PAIN_FUNCTIONAL_ASSESSMENT: 0-10
PAIN_FUNCTIONAL_ASSESSMENT: 0-10
PAIN_FUNCTIONAL_ASSESSMENT: UNABLE TO SELF-REPORT
PAIN_FUNCTIONAL_ASSESSMENT: 0-10
PAIN_FUNCTIONAL_ASSESSMENT: UNABLE TO SELF-REPORT

## 2025-06-20 ASSESSMENT — LIFESTYLE VARIABLES
SKIP TO QUESTIONS 9-10: 1
AUDIT-C TOTAL SCORE: 0
HOW OFTEN DO YOU HAVE 6 OR MORE DRINKS ON ONE OCCASION: NEVER
AUDIT-C TOTAL SCORE: 0
HOW MANY STANDARD DRINKS CONTAINING ALCOHOL DO YOU HAVE ON A TYPICAL DAY: PATIENT DOES NOT DRINK
HOW OFTEN DO YOU HAVE A DRINK CONTAINING ALCOHOL: NEVER

## 2025-06-20 ASSESSMENT — COLUMBIA-SUICIDE SEVERITY RATING SCALE - C-SSRS
1. IN THE PAST MONTH, HAVE YOU WISHED YOU WERE DEAD OR WISHED YOU COULD GO TO SLEEP AND NOT WAKE UP?: NO
2. HAVE YOU ACTUALLY HAD ANY THOUGHTS OF KILLING YOURSELF?: NO
6. HAVE YOU EVER DONE ANYTHING, STARTED TO DO ANYTHING, OR PREPARED TO DO ANYTHING TO END YOUR LIFE?: NO

## 2025-06-20 NOTE — ANESTHESIA PREPROCEDURE EVALUATION
"Patient: Chanda Licona    Procedure Information       Anesthesia Start Date/Time: 06/20/25 0727    Procedures:       Robot Assisted Sacrocolpopexy; Robot Assisted Supracervical Hysterectomy; Bilateral Salpingectomy (Bilateral: Abdomen)      Posterior Repair; Perineorrhaphy      Cystoscopy      Possible Mid-Urethral Sling    Location: [facility] / Virtual [facility]    Surgeons: Dana Holbrook MD                                                           Pre-Anesthesia Evaluation      Chanda Licona is a 45 y.o. female who presents for the above mentioned procedure due to Uterovaginal prolapse, incomplete [N81.2];Stress incontinence [N39.3]       Medical History[1]  Surgical History[2]  Social History[3]  RX Allergies[4]  Current Medications[5]  Prior to Admission medications    Medication Sig Start Date End Date Taking? Authorizing Provider   azithromycin (Zithromax) 500 mg tablet Take 1 tablet (500 mg) by mouth once daily for 3 days. 6/16/25 6/19/25  Dana Holbrook MD     No medication comments found.   Visit Vitals  /73 (BP Location: Right arm, Patient Position: Sitting)   Pulse 85   Temp 36.4 °C (97.5 °F) (Temporal)   Resp 18   Ht 1.575 m (5' 2\")   Wt 65.9 kg (145 lb 4.5 oz)   LMP  (LMP Unknown) Comment: IUD in place   SpO2 98%   BMI 26.57 kg/m²   OB Status IUD   Smoking Status Never   BSA 1.7 m²     Lab Results   Component Value Date    WBC 6.6 06/18/2025    HGB 13.3 06/18/2025    HCT 42.8 06/18/2025     06/18/2025    ABO O 06/18/2025     Lab Results   Component Value Date    TSH 1.74 02/03/2025    HGBA1C 5.3 08/18/2021    GLUCOSE 116 (H) 06/18/2025     06/18/2025    K 4.3 06/18/2025     06/18/2025    CREATININE 0.58 06/18/2025    BUN 12 06/18/2025    EGFR >90 06/18/2025    CO2 27 06/18/2025    AST 14 02/03/2025    ALT 14 02/03/2025       Lab Results   Component Value Date    PREGTESTUR Negative 06/20/2025            Relevant Problems   Pulmonary   (+) Recent URI "       Clinical information reviewed:   Tobacco  Allergies  Meds   Med Hx  Surg Hx  OB Status  Fam Hx  Soc   Hx        NPO Detail:  NPO/Void Status  Date of Last Liquid: 25  Time of Last Liquid: 430  Date of Last Solid: 25  Time of Last Solid:   Last Intake Type: Clear fluids  Time of Last Void: 0700         Physical Exam    Airway  Mallampati: II  TM distance: >3 FB  Neck ROM: full  Mouth opening: 3 or more finger widths     Cardiovascular   Rhythm: regular  Rate: normal     Dental - normal exam     Pulmonary Comments: Normal RR  Non-labored respiration    Abdominal            Anesthesia Plan    History of general anesthesia?: no  History of complications of general anesthesia?: unknown/emergency    ASA 2     general   (GETA with standard ASA monitoring)  The patient is not a current smoker.    intravenous induction   Postoperative pain plan includes opioids.  Anesthetic plan and risks discussed with patient.  Use of blood products discussed with patient who consented to blood products.    Plan discussed with CAA and CRNA.           [1]   Past Medical History:  Diagnosis Date    Personal history of other diseases of the nervous system and sense organs     History of migraine    Unspecified asthma, uncomplicated ([facility])     Asthma, acute   [2]   Past Surgical History:  Procedure Laterality Date     SECTION, CLASSIC  10/25/2016     Section   [3]   Social History  Tobacco Use    Smoking status: Never     Passive exposure: Never    Smokeless tobacco: Never   Vaping Use    Vaping status: Never Used   Substance Use Topics    Alcohol use: Never    Drug use: Never   [4] No Known Allergies  [5]   Current Facility-Administered Medications:     lactated Ringer's infusion, 20 mL/hr, intravenous, Continuous, Sarahy Strong MD

## 2025-06-20 NOTE — H&P
History Of Present Illness  Chanda Licona is a 45 y.o. female presenting with Stage 2-3 uterovaginal prolapse and USI desiring surgical management. Recent cough resolved.,     Past Medical History  Medical History[1]    Surgical History  No surgical history      Social History  She reports that she has never smoked. She has never been exposed to tobacco smoke. She has never used smokeless tobacco. She reports that she does not drink alcohol and does not use drugs.    Family History  Family History[2]     Allergies  Patient has no known allergies.    Review of Systems   Constitutional:  Negative for chills and fever.   HENT:  Negative for trouble swallowing.    Eyes:  Negative for visual disturbance.   Respiratory:  Negative for cough and shortness of breath.    Gastrointestinal:  Negative for abdominal distention, abdominal pain, nausea and vomiting.   Genitourinary:  Negative for dysuria, menstrual problem, pelvic pain, urgency and vaginal bleeding.   Musculoskeletal:  Negative for arthralgias, gait problem and myalgias.   Skin: Negative.    Neurological: Negative.    Psychiatric/Behavioral: Negative.          Physical Exam  Vitals reviewed.   Constitutional:       Appearance: Normal appearance.   HENT:      Head: Normocephalic and atraumatic.   Cardiovascular:      Pulses: Normal pulses.   Pulmonary:      Effort: Pulmonary effort is normal.   Abdominal:      General: Abdomen is flat.      Palpations: Abdomen is soft.      Tenderness: There is no abdominal tenderness. There is no guarding or rebound.   Musculoskeletal:      Cervical back: Normal range of motion and neck supple.   Skin:     General: Skin is warm and dry.   Neurological:      General: No focal deficit present.      Mental Status: She is alert and oriented to person, place, and time. Mental status is at baseline.   Psychiatric:         Mood and Affect: Mood normal.         Behavior: Behavior normal.          Last Recorded Vitals  There were no  vitals taken for this visit.       Assessment & Plan  Uterovaginal prolapse, incomplete    Stress incontinence      Proceed with scheduled RA-CRISTÓBAL, BS, SCP, possible APR, MUS with cystoscopy   Ancef ppx   Postop care and instructions discussed     Hilda Earl MD  URPS Fellow          [1]   Past Medical History:  Diagnosis Date    Personal history of other diseases of the nervous system and sense organs     History of migraine    Unspecified asthma, uncomplicated ([facility])     Asthma, acute   [2] No family history on file.

## 2025-06-20 NOTE — OP NOTE
Robot Assisted Sacrocolpopexy; Robot Assisted Supracervical Hysterectomy; Bilateral Salpingectomy (B) Operative Note     Date: 2025  OR Location: [facility] A OR    Name: Chanda Licona, : 1980, Age: 45 y.o., MRN: [id number], Sex: female    Diagnosis  Pre-op Diagnosis      * Uterovaginal prolapse, incomplete [N81.2]     * Stress incontinence [N39.3] Post-op Diagnosis     * Uterovaginal prolapse, incomplete [N81.2]     * Stress incontinence [N39.3]     Procedures  Robot Assisted Sacrocolpopexy; Robot Assisted Supracervical Hysterectomy; Bilateral Salpingectomy  75430 - [place] LAPAROSCOPY COLPOPEXY SUSPENSION VAGINAL APEX    Robot Assisted Sacrocolpopexy; Robot Assisted Supracervical Hysterectomy; Bilateral Salpingectomy  30613 - [place] LAPAROSCOPY SUPRACERVICAL HYSTERECTOMY 250 GM/<    Posterior Repair; Perineorrhaphy  42867 - [place] POST COLPORRHAPHY RECTOCELE W/WO PERINEORRHAPHY    Cystoscopy  03845 - [place] CYSTOURETHROSCOPY    Possible Mid-Urethral Sling  02180 - [place] SLING OPERATION STRESS INCONTINENCE      Surgeons   Panel 1:     * Dana Holbrook - Primary  Panel 2:     * Dana Holbrook - Primary    Resident/Fellow/Other Assistant:  Hilda Earl MD - Fellow    Staff:   Surgical Assistant:   Circulator: Ashlie  Scrub Person: Jael  Circulator: Roselia Kimbleub Person: Carlos Kimbleub Person: Edyta Wen Circulator: Ghislaine    Anesthesia Staff: Anesthesiologist: Sarahy Strong MD  C-AA: CARL Kelly; CARL Gaspar    Procedure Summary  Anesthesia: General  ASA: II  Estimated Blood Loss: 40mL  Intra-op Medications:   Administrations occurring from 0705 to 1135 on 25:   Medication Name Total Dose   BUPivacaine HCl (Marcaine) 0.25 % (2.5 mg/mL) injection 20 mL   sodium chloride 0.9 % irrigation solution 1,000 mL   sterile water irrigation solution 500 mL   acetaminophen (Tylenol) tablet 975 mg 975 mg   celecoxib (CeleBREX) capsule 400 mg 400 mg   gabapentin (Neurontin) capsule 600 mg 600 mg    phenazopyridine (Pyridium) tablet 200 mg 200 mg   cefOXitin (Mefoxin) 2 g 2 g   dexAMETHasone (Decadron) 4 mg/mL IV Syringe 2 mL 10 mg   fentaNYL (Sublimaze) injection 50 mcg/mL 100 mcg   HYDROmorphone (Dilaudid) injection 1 mg/mL 1 mg   ketamine injection 50 mg/ 5 mL (10 mg/mL) 50 mg   ketorolac (Toradol) injection 30 mg 30 mg   LR bolus Cannot be calculated   LR bolus Cannot be calculated   LR bolus Cannot be calculated   lidocaine (Xylocaine) injection 2 % 50 mg   magnesium sulfate 50 % injection 2 g   metroNIDAZOLE (Flagyl)  mg/100 mL (premix) 500 mg   midazolam PF (Versed) injection 1 mg/mL 2 mg   ondansetron (Zofran) 2 mg/mL injection 4 mg   phenylephrine 100 mcg/mL syringe 10 mL (prefilled) 400 mcg   propofol (Diprivan) injection 10 mg/mL 900.71 mg   rocuronium (ZeMuron) 50 mg/5 mL injection 100 mg   succinylcholine (Anectine) 20 mg/mL injection 120 mg              Anesthesia Record               Intraprocedure I/O Totals          Intake    LR bolus 2900.00 mL    Total Intake 2900 mL       Output    Est. Blood Loss 40 mL    Total Output 40 mL       Net    Net Volume 2860 mL          Specimen:   ID Type Source Tests Collected by Time   1 : UTERUS AND BILATERAL FALLOPIAN TUBES Tissue UTERUS WITHOUT CERVIX, AND BILATERAL FALLOPIAN TUBES SURGICAL PATHOLOGY EXAM Dana Holbrook MD 6/20/2025 1100   2 : VAGINAL MUCOSA Tissue VAGINAL MUCOSA SURGICAL PATHOLOGY EXAM Dana Holbrook MD 6/20/2025 1126        Drains and/or Catheters:   Urethral Catheter Non-latex 16 Fr. (Active)         Implants:  Implants       Type Name Action Serial No.      Mesh RESTORELLE Implanted [id number]     Implant SLING, ADVANTAGE FIT BLUE SYSTEM - SNA - [id number] Implanted NA            Findings: Stage 2-3 apical predominant uterovaginal prolapse; all compartments corrected at end of case; grossly normal cervix, uterus, tubes and ovaries with IUD in the uterine cavity that was removed intact; on cystourethroscopy normal  mucosa without evidence of injury or foreign body and active efflux from bilateral ureteral orifices     Indications: Chanda Licona is an 45 y.o. female who is having surgery for Uterovaginal prolapse, incomplete [N81.2]  Stress incontinence [N39.3].     The patient was seen in the preoperative area. The risks, benefits, complications, treatment options, non-operative alternatives, expected recovery and outcomes were discussed with the patient. The possibilities of reaction to medication, pulmonary aspiration, injury to surrounding structures, bleeding, recurrent infection, the need for additional procedures, failure to diagnose a condition, and creating a complication requiring transfusion or operation were discussed with the patient. The patient concurred with the proposed plan, giving informed consent.  The site of surgery was properly noted/marked if necessary per policy. The patient has been actively warmed in preoperative area. Preoperative antibiotics have been ordered and given within 1 hours of incision. Venous thrombosis prophylaxis have been ordered including bilateral sequential compression devices    Procedure Details: The patient was interviewed in the preop holding area by the surgical team, where the risks, benefits, and indication of the procedure were reviewed.  She was then transferred to the operating suite where the patient was properly identified and the procedure was confirmed. When adequate anesthesia was obtained, the patient was prepped and draped in a dorsal lithotomy position utilizing yellow fin stirrups, the pink pad positioning device and purple arm protectors. A time-out was performed. Preoperative antibiotics were given. A Velasco catheter was inserted under sterile conditions. An EEA sizer was placed in the vagina.    We then turned our attention to the abdomen. Abdominal insufflation was obtained via Veress needle entry located above the umbilicus. Pneumoperitoneum was obtained.  We then directly entered the peritoneal cavity with an 8 mm robotic trocar. The remainder of the trocars were then placed, three 8 mm robotic trocars, and one 10 mm assistant trocar in the left upper quadrant.    The patient was then placed in steep Trendelenburg position, and the above findings were noted including normal upper abdominal survey and normal appearing appendix.  We began the procedure by docking the robot in the standard fashion and inserting the robotic instruments.    The mesosalpinx along the inferior edge of the uterine tubes was cauterized and cut. Both tubes were removed from the abdomen. The utero-ovarian ligaments were similarly cauterized and cut. The round ligaments were then grasped, cauterized and cut.  The broad ligament was then taken down, first anteriorly, creating a bladder flap and also creating the anterior vaginal dissection, and then posteriorly, skeletonizing the uterine vessels. The uterine blood vessels were then cauterized and cut. The uterus was than amputated from the cervix at the level of the internal os using the monopolar scissors. The uterus was placed in the RUQ. The IUD string was easily grasped from the cervical canal on the uterine specimen and the IUD removed intact from the abdomen. The cervical stump was cauterized and then was closed with a running 2-0 Stratafix in two layers.    Next, the peritoneum overlying the sacrum was incised with the monopolar scissors, exposing the periosteum. Cautery was used as needed for hemostasis. The peritoneum was opened caudally, extending to the vagina. The anterior vagina was then  from the bladder with a combination of sharp, blunt, and cautery dissection. The peritoneum was removed from the posterior vagina.     The Restorelle mesh was sutured to the vagina both anteriorly and posteriorly with a series of interrupted 0 Goretex sutures. Three interrupted sutures of 0 Goretex were used to fix the mesh to the anterior  longitudinal ligament such that it elevated the vaginal apex without undue tension. Additional interrupted suture in the sacral arm of the mesh was used to adjust tension appropriately.    The pelvis was irrigated and hemostasis achieved. The peritoneum overlying the mesh was then closed with a running 0 Vicryl suture, and the bladder peritoneum was brought down to meet the sacral peritoneum.     The uterus was held by a ratcheted laparoscopic Allis clamp through the left lateral most port. The assistant trocar was then removed and the fascial incision closed with 0-Vicryl using the Eze Bajwa under direct visualization. All the other instruments and ports were then removed and the robot undocked. The umbilical incision was extended minimally cranially to allow for placement of a small Nathanael retractor. The uterus was then brought into the Nathanael and grasped through the Nathanael with a Kocher clamp. The specimen was sharply incised and easily removed from the abdomen. The final lateral port was then removed. The umbilical fascia was closed with 0 Vicryl, and all of the skin incisions were closed with 3-0 Monocryl. All incisions were covered with skin glue.    Attention was then turned to the midurethral sling portion. The Lonestar retractor was placed around the perineum. The anterior vaginal wall was infiltrated with 1% lidocaine with epinephrine under the urethra towards the pubic rami bilaterally. The vaginal epithelium was incised at the midline at the midurethra and tunnels created bilaterally to the pubic rami using Metzenbaum scissors. The cystoscope was placed and used to deflect the bladder to the left side as the sling trocar were passed in the right tunnel. The process was repeated  on the other side. The bladder was inspected and found to be without injury. The mesh was then brought through the incisions and a curved Milian scissor used to ensure it was tension free. The mesh was trimmed and vaginal  mucosa closed with 3-0 Monocryl in two layers. The suprapubic sites were closed with skin glue. The Velasco catheter was replaced.     The posterior repair and perineoplasty margins were then outlined and injected with 1% lidocaine with epinephrine. A superficial incision was made through the outline with a knife. The mucosal incision was carried proximally 3 cm to include a distal rectocele. The perineal area was de-epithelialized and the vaginal mucosa was dissected off sharply. Once the dissection was completed, the rectovaginal septal tissue and then the perineal muscular tissue was plicated in the midline with a series of figure-of-eight stitches with 3-0 Vicryl until the dead space was completely closed. Separate Vicryl sutures were placed to rebuild the perineal body. The vaginal mucosa was then trimmed and re-approximated with 3-0 Monocryl until the wound was completely closed.     The patient was then awakened from anesthesia, having tolerated the procedure well, and was taken to the recovery room in stable condition. All sponge, needle, and instrument counts were correct at the end the case.    Hilda Earl MD, Urogynecology Fellow    Dr. Holbrook was present for all critical potions of the procedure.   Evidence of Infection: No   Complications:  None; patient tolerated the procedure well.    Disposition: PACU - hemodynamically stable.  Condition: stable

## 2025-06-20 NOTE — ANESTHESIA PROCEDURE NOTES
Airway  Date/Time: 6/20/2025 7:58 AM  Reason: elective    Airway not difficult    Staffing  Performed: YEN   Authorized by: Sarahy Strong MD    Performed by: CARL Kelly  Patient location during procedure: OR    Patient Condition  Indications for airway management: anesthesia  Patient position: sniffing  Sedation level: deep     Final Airway Details   Preoxygenated: yes  Final airway type: endotracheal airway  Successful airway: ETT  Cuffed: yes   Successful intubation technique: direct laryngoscopy  Adjuncts used in placement: intubating stylet and cricoid pressure  Endotracheal tube insertion site: oral  Blade: Kyle  Blade size: #3  ETT size (mm): 7.0  Cormack-Lehane Classification: grade IIb - view of arytenoids or posterior of glottis only  Placement verified by: chest auscultation and capnometry   Measured from: teeth  ETT to teeth (cm): 22  Number of attempts at approach: 1    Additional Comments  RSI with cricoid pressure; LTA kit used

## 2025-06-20 NOTE — ANESTHESIA PROCEDURE NOTES
Peripheral IV  Date/Time: 6/20/2025 8:10 AM  Inserted by: CARL Kelly    Placement  Needle size: 20 G  Laterality: right  Location: hand  Local anesthetic: none  Site prep: alcohol  Technique: anatomical landmarks  Attempts: 1

## 2025-06-20 NOTE — ANESTHESIA POSTPROCEDURE EVALUATION
Patient: Chanda Licona    Procedure Summary       Date: 06/20/25 Room / Location: [facility] A OR 08 / Virtual [facility] A OR    Anesthesia Start: 0727 Anesthesia Stop: 1155    Procedures:       Robot Assisted Sacrocolpopexy; Robot Assisted Supracervical Hysterectomy; Bilateral Salpingectomy (Bilateral: Abdomen)      Posterior Repair; Perineorrhaphy (Vagina )      Cystoscopy (Bladder)      Possible Mid-Urethral Sling (Pelvis) Diagnosis:       Uterovaginal prolapse, incomplete      Stress incontinence      (Uterovaginal prolapse, incomplete [N81.2])      (Stress incontinence [N39.3])    Surgeons: Dana Holbrook MD Responsible Provider: Sarahy Strong MD    Anesthesia Type: general ASA Status: 2            Anesthesia Type: general    Vitals Value Taken Time   /55 06/20/25 12:01   Temp 36.3 °C (97.3 °F) 06/20/25 11:51   Pulse 84 06/20/25 12:03   Resp 11 06/20/25 12:00   SpO2 96 % 06/20/25 12:03   Vitals shown include unfiled device data.    Anesthesia Post Evaluation    Patient location during evaluation: PACU  Patient participation: complete - patient participated  Level of consciousness: awake  Pain management: adequate  Multimodal analgesia pain management approach  Airway patency: patent  Cardiovascular status: hemodynamically stable  Respiratory status: spontaneous ventilation  Hydration status: euvolemic  Postoperative Nausea and Vomiting: none        No notable events documented.

## 2025-06-20 NOTE — CARE PLAN
The patient's goals for the shift include      The clinical goals for the shift include Pain and nausea and vomiting  control    Over the shift, the patient is making progress toward the following goals. Barriers to progression include     Problem: Pain - Adult  Goal: Verbalizes/displays adequate comfort level or baseline comfort level  Outcome: Progressing     Problem: Safety - Adult  Goal: Free from fall injury  Outcome: Progressing     Problem: Discharge Planning  Goal: Discharge to home or other facility with appropriate resources  Outcome: Progressing     Problem: Chronic Conditions and Co-morbidities  Goal: Patient's chronic conditions and co-morbidity symptoms are monitored and maintained or improved  Outcome: Progressing     Problem: Nutrition  Goal: Nutrient intake appropriate for maintaining nutritional needs  Outcome: Progressing

## 2025-06-20 NOTE — CARE PLAN
The patient's goals for the shift include      The clinical goals for the shift include Pain and nausea and vomiting  control    Over the shift, the patient is making progress toward the following goals. Barriers to progression include     Problem: Pain - Adult  Goal: Verbalizes/displays adequate comfort level or baseline comfort level  6/20/2025 1854 by Tracy Reed RN  Outcome: Progressing  6/20/2025 1854 by Tracy Reed RN  Outcome: Progressing     Problem: Safety - Adult  Goal: Free from fall injury  6/20/2025 1854 by Tracy Reed RN  Outcome: Progressing  6/20/2025 1854 by Tracy Reed RN  Outcome: Progressing     Problem: Discharge Planning  Goal: Discharge to home or other facility with appropriate resources  6/20/2025 1854 by Tracy Reed RN  Outcome: Progressing  6/20/2025 1854 by Tracy Reed RN  Outcome: Progressing     Problem: Chronic Conditions and Co-morbidities  Goal: Patient's chronic conditions and co-morbidity symptoms are monitored and maintained or improved  6/20/2025 1854 by Tracy Reed RN  Outcome: Progressing  6/20/2025 1854 by Tracy Reed RN  Outcome: Progressing     Problem: Nutrition  Goal: Nutrient intake appropriate for maintaining nutritional needs  6/20/2025 1854 by Tracy Reed RN  Outcome: Progressing  6/20/2025 1854 by Tracy Reed RN  Outcome: Progressing

## 2025-06-20 NOTE — DISCHARGE INSTRUCTIONS
Call Dr. Holbrook's office for any problems and/or concerns    *Walk as much as possible, it is ok to use stairs carefully  *Ok to shower, avoid soaking in tub baths or swimming for 4-6 weeks after surgery   *Continue the coughing and deep breathing exercises that your learned in the hospital  *No lifting/straining and avoid constipation for 4-6 weeks (Avoid strenuous activity)  *Prevent constipation by using stool softeners and staying hydrated, so that you do not strain against your stitches or have pain from constipation  *Vaginal rest for 6 weeks (Do not put anything in your vagina except prescribed vaginal estrogen. Do not use tampons or douches. Do not have sex until cleared by physician)    Call Doctor right away for:    *Fever above 100.4/shaking chills  *Bright red vaginal bleeding or bleeding that soaks more than one sanitary pad per hour  *A foul smelling discharge from the vagina  *Trouble urinating or burning with urination  *Severe pain or bloating in your abdomen  *Persistent nausea/vomiting  *Redness, swelling, or drainage at your incision sites  *Chest pain/shortness of breath-call 911.    - You will be going home with prescriptions for pain medication. If you are able to take them, alternate a dose of Acetaminophen (Tylenol) and Ibuprofen (Motrin) every 3 hours. (For example, 1000mg of Tylenol at 09:00am, 600mg ibuprofen at 12:00pm, 1000mg of Tylenol at 3:00pm, 600mg ibuprofen at 6:00pm).   - Use any prescribed narcotic (ie oxycodone) for breakthrough pain as prescribed.   - Use a stool softener, such as Miralax, to prevent constipation from the narcotic medication.   - If you are going home with or already have a prescription for estrogen cream, you may begin/resume use vaginally as prescribed nightly until your 2 week post-op visit.

## 2025-06-21 VITALS
OXYGEN SATURATION: 98 % | HEART RATE: 83 BPM | BODY MASS INDEX: 26.74 KG/M2 | RESPIRATION RATE: 18 BRPM | TEMPERATURE: 98.3 F | SYSTOLIC BLOOD PRESSURE: 93 MMHG | DIASTOLIC BLOOD PRESSURE: 58 MMHG | WEIGHT: 145.28 LBS | HEIGHT: 62 IN

## 2025-06-21 PROCEDURE — 7100000011 HC EXTENDED STAY RECOVERY HOURLY - NURSING UNIT

## 2025-06-21 PROCEDURE — 2500000001 HC RX 250 WO HCPCS SELF ADMINISTERED DRUGS (ALT 637 FOR MEDICARE OP)

## 2025-06-21 PROCEDURE — 2500000001 HC RX 250 WO HCPCS SELF ADMINISTERED DRUGS (ALT 637 FOR MEDICARE OP): Performed by: STUDENT IN AN ORGANIZED HEALTH CARE EDUCATION/TRAINING PROGRAM

## 2025-06-21 PROCEDURE — 2500000004 HC RX 250 GENERAL PHARMACY W/ HCPCS (ALT 636 FOR OP/ED): Performed by: STUDENT IN AN ORGANIZED HEALTH CARE EDUCATION/TRAINING PROGRAM

## 2025-06-21 PROCEDURE — 99239 HOSP IP/OBS DSCHRG MGMT >30: CPT | Performed by: STUDENT IN AN ORGANIZED HEALTH CARE EDUCATION/TRAINING PROGRAM

## 2025-06-21 RX ORDER — SENNOSIDES 8.6 MG/1
1 TABLET ORAL ONCE
Status: COMPLETED | OUTPATIENT
Start: 2025-06-21 | End: 2025-06-21

## 2025-06-21 RX ORDER — OXYCODONE HYDROCHLORIDE 5 MG/1
2.5 TABLET ORAL EVERY 4 HOURS PRN
Refills: 0 | Status: DISCONTINUED | OUTPATIENT
Start: 2025-06-21 | End: 2025-06-21 | Stop reason: HOSPADM

## 2025-06-21 RX ORDER — OXYMETAZOLINE HCL 0.05 %
2 SPRAY, NON-AEROSOL (ML) NASAL EVERY 12 HOURS PRN
Status: DISCONTINUED | OUTPATIENT
Start: 2025-06-21 | End: 2025-06-21 | Stop reason: HOSPADM

## 2025-06-21 RX ADMIN — OXYCODONE HYDROCHLORIDE 2.5 MG: 5 TABLET ORAL at 03:39

## 2025-06-21 RX ADMIN — SENNOSIDES 8.6 MG: 8.6 TABLET, FILM COATED ORAL at 10:53

## 2025-06-21 RX ADMIN — POLYETHYLENE GLYCOL 3350 17 G: 17 POWDER, FOR SOLUTION ORAL at 09:41

## 2025-06-21 RX ADMIN — KETOROLAC TROMETHAMINE 30 MG: 30 INJECTION, SOLUTION INTRAMUSCULAR at 10:53

## 2025-06-21 RX ADMIN — OXYCODONE HYDROCHLORIDE 2.5 MG: 5 TABLET ORAL at 09:41

## 2025-06-21 RX ADMIN — OXYMETAZOLINE HYDROCHLORIDE 2 SPRAY: 0.05 SPRAY NASAL at 03:34

## 2025-06-21 ASSESSMENT — COGNITIVE AND FUNCTIONAL STATUS - GENERAL
DAILY ACTIVITIY SCORE: 22
DRESSING REGULAR LOWER BODY CLOTHING: A LITTLE
HELP NEEDED FOR BATHING: A LITTLE
MOBILITY SCORE: 24

## 2025-06-21 ASSESSMENT — PAIN - FUNCTIONAL ASSESSMENT
PAIN_FUNCTIONAL_ASSESSMENT: 0-10
PAIN_FUNCTIONAL_ASSESSMENT: 0-10

## 2025-06-21 ASSESSMENT — PAIN SCALES - GENERAL
PAINLEVEL_OUTOF10: 8
PAINLEVEL_OUTOF10: 5 - MODERATE PAIN

## 2025-06-21 ASSESSMENT — PAIN DESCRIPTION - LOCATION: LOCATION: ABDOMEN

## 2025-06-21 ASSESSMENT — PAIN DESCRIPTION - DESCRIPTORS
DESCRIPTORS: ACHING
DESCRIPTORS: ACHING

## 2025-06-21 NOTE — CARE PLAN
The patient's goals for the shift include      The clinical goals for the shift include Pain and nausea and vomiting  control    Problem: Pain - Adult  Goal: Verbalizes/displays adequate comfort level or baseline comfort level  Outcome: Progressing     Problem: Safety - Adult  Goal: Free from fall injury  Outcome: Progressing     Problem: Discharge Planning  Goal: Discharge to home or other facility with appropriate resources  Outcome: Progressing     Problem: Chronic Conditions and Co-morbidities  Goal: Patient's chronic conditions and co-morbidity symptoms are monitored and maintained or improved  Outcome: Progressing     Problem: Nutrition  Goal: Nutrient intake appropriate for maintaining nutritional needs  Outcome: Progressing

## 2025-06-21 NOTE — DISCHARGE SUMMARY
Discharge Diagnosis  Postoperative nausea and vomiting    Issues Requiring Follow-Up  S/P robotic sacrocolpopexy, robot assisted supracervical hysterectomy, b/l salpingectomy     Test Results Pending At Discharge  Pending Labs       Order Current Status    Surgical Pathology Exam In process            Hospital Course   45 yr old female with PMH of uterovaginal prolapse and stress incontinence s/p robotic sacrocolpopexy, robot assisted supracervical hysterectomy, b/l salpingectomy on 6/20/25 with Dr. Holbrook. Please see operative report for full details. Patient tolerated the procedure well and recovered briefly in PACU before being transitioned to regular nursing floor. Post-op course was uncomplicated. Diet was advanced as tolerated.  IV medication transitioned to oral as diet advanced. On the day of discharge, the pt was tolerating a diet, pain was controlled on PO pain medication, and they were ambulating and voiding spontaneously. Pt discharged home  on 6/21/25 in stable condition with instructions to follow up as outpatient.      Visit Vitals  BP 99/54 (BP Location: Right arm, Patient Position: Lying)   Pulse 71   Temp 37.1 °C (98.8 °F) (Temporal)   Resp 16     Vitals:    06/20/25 0703   Weight: 65.9 kg (145 lb 4.5 oz)       Immunization History   Administered Date(s) Administered    COVID-19, mRNA, LNP-S, PF, 30 mcg/0.3 mL dose 12/16/2020, 01/13/2021, 09/30/2021       Results        Pertinent Physical Exam At Time of Discharge  PE:  Constitutional: A&Ox3, calm and cooperative, NAD  Eyes: EOMI, clear sclera   Cardiovascular: Normal rate and regular rhythm. No murmurs  Respiratory/Thorax: CTAB, on RA  Gastrointestinal: Abdomen soft, mildly distended, appropriately tender, +BS x 4, lap sites C/D/I with dermabond, well approximated w/o surrounding erythema or drainage.   Genitourinary: Voiding independently   Musculoskeletal: ROM intact  Extremities: No peripheral edema, contusions, or wounds  Neurological: A&Ox3,  No focal deficits   Psychological: Appropriate mood and behavior      Home Medications     Medication List      START taking these medications     acetaminophen 500 mg tablet; Commonly known as: Tylenol; Take 2 tablets   (1,000 mg) by mouth every 6 hours if needed for mild pain (1 - 3).   ibuprofen 600 mg tablet; Take 1 tablet (600 mg) by mouth every 6 hours   for 7 days.   ondansetron ODT 4 mg disintegrating tablet; Commonly known as:   Zofran-ODT; Dissolve 1 tablet (4 mg) in the mouth every 8 hours if needed   for nausea or vomiting.   oxyCODONE 5 mg immediate release tablet; Commonly known as: Roxicodone;   Take 1 tablet (5 mg) by mouth every 6 hours if needed for moderate pain (4   - 6) for up to 3 days.   polyethylene glycol 17 gram/dose powder; Commonly known as: Glycolax,   Miralax; Mix 17 g of powder and drink once daily. You may take more or   less as needed for constipation.     STOP taking these medications     azithromycin 500 mg tablet; Commonly known as: Zithromax       Outpatient Follow-Up  Future Appointments   Date Time Provider Department Center   7/3/2025  3:30 PM MD Chris [id number] [facility]   8/11/2025  3:45 PM MD Chris [id number]   2/9/2026  3:30 PM Edyta Wilkinson MD [id number] [facility]       Valeri Vera PA-C

## 2025-06-23 ASSESSMENT — PAIN SCALES - GENERAL: PAINLEVEL_OUTOF10: 7

## 2025-07-02 NOTE — PROGRESS NOTES
Urogynecology  Provider:  Dana Holbrook MD  [phone]              ASSESSMENT AND PLAN:   45-year-old female being assessed s/p sacrocolpopexy, CRISTÓBAL, BSO, NJ, perineorrhaphy, cystoscopy, and MUS on 6/20/2025 with Dr. Holbrook.    Diagnoses:  #1 Uterovaginal prolapse (resolved)  #2 Stress urinary incontinence (resolved)  #3 Post-operative state    Plan:  Uterovaginal prolapse (resolved), PATRICE (resolved), post-operative state  - 6/20/2025 surgery with Dr. Holbrook: SCPXY, CRISTÓBAL, BSO, NJ, perineorrhaphy, cystoscopy, and MUS.  - On exam, well-healed abdominal incisions, beautifully supported cervix, no abnormalities, no mesh erosion. Fullness anterior to the rectum, likely due to the posterior cervix being somewhat globular. Trimmed perineal stitches, which she tolerated well with no immediate complications. Applied silver nitrate, also tolerated well.  - She reports some discomfort, particularly in the LUQ, and is managing pain with ibuprofen.  - Continue ibuprofen PRN, with a gradual reduction in dosage.  - She reports mild urgency with urination, which is expected post-op and should improve over time.  - She is taking Colace and MiraLAX to manage bowel movements and prevent constipation, which is effective.  - Discussed that her ovaries looked normal, so we left them.    2. Mild cellulitis  - She presents with a yellowish discharge and mild cellulitis at the incision site.  - Rx for Keflex 500 mg QID for 7 days sent to patient's preferred pharmacy.    Follow-up with Dr. Holbrook in 1 month for further evaluation.    Scribe Attestation  By signing my name below, I, Kia Davila, attest that this documentation has been prepared under the direction and in the presence of Dana Holbrook MD on 07/03/2025 at 4:51 PM.    Agree with above. I Dr. Holbrook, personally performed the services described in the documentation which was scribed virtually and confirm it is both complete and accurate.  Dana ESTRADA  MD Yusef      Problem List Items Addressed This Visit    None          I spent a total of eConsult Time: 20 minutes in face to face and non face to face time.        Dana Holbrook MD        HISTORY OF PRESENT ILLNESS:     6/20/25  Robot Assisted Sacrocolpopexy; Robot Assisted Supracervical Hysterectomy; Bilateral Salpingectomy  92739 - [place] LAPAROSCOPY COLPOPEXY SUSPENSION VAGINAL APEX     Robot Assisted Sacrocolpopexy; Robot Assisted Supracervical Hysterectomy; Bilateral Salpingectomy  64366 - ND LAPAROSCOPY SUPRACERVICAL HYSTERECTOMY 250 GM/<     Posterior Repair; Perineorrhaphy  03508 - ND POST COLPORRHAPHY RECTOCELE W/WO PERINEORRHAPHY     Cystoscopy  98945 - ND CYSTOURETHROSCOPY     Possible Mid-Urethral Sling  58396 - ND SLING OPERATION STRESS INCONTINENCE         Post-operative Symptoms:  - She is 2 weeks post-op.  - She reports feeling good overall but experiences some tiredness by the end of the day.  - She mentions a little bit of yellowish discharge with a slight smell, which might be cellulitis.  - No issues with coughing, sneezing, or associated urinary leakage.  - She is taking Colace and MiraLAX to avoid constipation and reports bowel movements are soft.  - She is weaning off ibuprofen, taking it once or twice a day.  - She plans to return to work on July 31.         Past Medical History:      Medical History[1]       Past Surgical History:       Surgical History[2]      Medications:       Prior to Admission medications    Medication Sig Start Date End Date Taking? Authorizing Provider   acetaminophen (Tylenol) 500 mg tablet Take 2 tablets (1,000 mg) by mouth every 6 hours if needed for mild pain (1 - 3). 6/20/25   Hilda Earl MD   ibuprofen 600 mg tablet Take 1 tablet (600 mg) by mouth every 6 hours for 7 days. 6/20/25 6/27/25  Hilda Earl MD   ondansetron ODT (Zofran-ODT) 4 mg disintegrating tablet Dissolve 1 tablet (4 mg) in the mouth every 8 hours if needed for nausea or  vomiting. 6/20/25   Hilda Earl MD   polyethylene glycol (Glycolax, Miralax) 17 gram/dose powder Mix 17 g of powder and drink once daily. You may take more or less as needed for constipation. 6/20/25   Hilda Earl MD        ROS  Review of Systems   Constitutional: Negative.    HENT: Negative.     Eyes: Negative.    Respiratory: Negative.     Cardiovascular: Negative.    Gastrointestinal: Negative.    Endocrine: Negative.    Genitourinary: Negative.    Musculoskeletal: Negative.    Neurological: Negative.    Psychiatric/Behavioral: Negative.          POC Blood, Urine   Date Value Ref Range Status   09/16/2024 TRACE-Intact (A) NEGATIVE Final     Poc Nitrite, Urine   Date Value Ref Range Status   09/16/2024 NEGATIVE NEGATIVE Final     POC Urobilinogen, Urine   Date Value Ref Range Status   09/16/2024 0.2 0.2, 1.0 EU/DL Final     POC Leukocytes, Urine   Date Value Ref Range Status   09/16/2024 NEGATIVE NEGATIVE Final         PHYSICAL EXAM:      LMP  (LMP Unknown) Comment: IUD in place     No LMP recorded (lmp unknown). (Menstrual status: IUD).      Declines chaperone for physical exam.      Well developed, well nourished, in no apparent distress.   Neurologic/Psychiatric:  Awake, Alert and Oriented times 3.  Affect normal.     GENITAL/URINARY:       External Genitalia:  The patient has normal appearing external genitalia, normal skenes and bartholins glands, and a normal hair distribution.  Her vulva is without lesions, erythema or discharge.  It is non-tender with appropriate sensation.     Urethral Meatus:  Size normal, Location normal, Lesions absent, Prolapse absent,      Urethra:  Fullness absent, Masses absent,      Bladder:  Fullness absent, Masses absent, Tenderness absent,      Vagina:  General appearance normal, Estrogen effect normal, Discharge absent, Lesions absent     Cervix: Normal, no discharge.   Uterus:  surgically absent  Adnexa: normal             Data and DIAGNOSTIC STUDIES REVIEWED  "  Imaging  No results found.    Cardiology, Vascular, and Other Imaging  No other imaging results found for the past 7 days     No results found for: \"URINECULTURE\", \"[employer]\"   Lab Results   Component Value Date    GLUCOSE 116 (H) 06/18/2025    CALCIUM 9.0 06/18/2025     06/18/2025    K 4.3 06/18/2025    CO2 27 06/18/2025     06/18/2025    BUN 12 06/18/2025    CREATININE 0.58 06/18/2025     Lab Results   Component Value Date    WBC 6.6 06/18/2025    HGB 13.3 06/18/2025    HCT 42.8 06/18/2025    MCV 91 06/18/2025     06/18/2025          Dana Holbrook MD             [1]   Past Medical History:  Diagnosis Date    Personal history of other diseases of the nervous system and sense organs     History of migraine    Unspecified asthma, uncomplicated ([facility])     Asthma, acute   [2]   Past Surgical History:  Procedure Laterality Date    SACROCOLPOPEXY N/A 06/20/2025    supracervical hysterectomy, bilateral salpingectomy    URETHRAL SLING N/A 06/20/2025    [employer]     "

## 2025-07-03 ENCOUNTER — OFFICE VISIT (OUTPATIENT)
Dept: OBSTETRICS AND GYNECOLOGY | Facility: CLINIC | Age: 45
End: 2025-07-03
Payer: COMMERCIAL

## 2025-07-03 VITALS
HEART RATE: 78 BPM | DIASTOLIC BLOOD PRESSURE: 65 MMHG | BODY MASS INDEX: 26.24 KG/M2 | WEIGHT: 142.6 LBS | SYSTOLIC BLOOD PRESSURE: 102 MMHG | HEIGHT: 62 IN

## 2025-07-03 DIAGNOSIS — T81.49XA INCISIONAL INFECTION: ICD-10-CM

## 2025-07-03 DIAGNOSIS — N39.3 SUI (STRESS URINARY INCONTINENCE, FEMALE): ICD-10-CM

## 2025-07-03 DIAGNOSIS — N39.3 STRESS INCONTINENCE: Primary | ICD-10-CM

## 2025-07-03 LAB
LABORATORY COMMENT REPORT: NORMAL
PATH REPORT.FINAL DX SPEC: NORMAL
PATH REPORT.GROSS SPEC: NORMAL
PATH REPORT.RELEVANT HX SPEC: NORMAL
PATH REPORT.TOTAL CANCER: NORMAL
POC APPEARANCE, URINE: CLEAR
POC BILIRUBIN, URINE: NEGATIVE
POC BLOOD, URINE: ABNORMAL
POC COLOR, URINE: YELLOW
POC GLUCOSE, URINE: NEGATIVE MG/DL
POC KETONES, URINE: NEGATIVE MG/DL
POC LEUKOCYTES, URINE: ABNORMAL
POC NITRITE,URINE: NEGATIVE
POC PH, URINE: 7 PH
POC PROTEIN, URINE: ABNORMAL MG/DL
POC SPECIFIC GRAVITY, URINE: 1.02
POC UROBILINOGEN, URINE: 0.2 EU/DL

## 2025-07-03 PROCEDURE — 3008F BODY MASS INDEX DOCD: CPT | Performed by: OBSTETRICS & GYNECOLOGY

## 2025-07-03 PROCEDURE — 81003 URINALYSIS AUTO W/O SCOPE: CPT | Mod: QW | Performed by: OBSTETRICS & GYNECOLOGY

## 2025-07-03 PROCEDURE — 99211 OFF/OP EST MAY X REQ PHY/QHP: CPT | Performed by: OBSTETRICS & GYNECOLOGY

## 2025-07-03 RX ORDER — CEPHALEXIN 500 MG/1
500 CAPSULE ORAL 4 TIMES DAILY
Qty: 28 CAPSULE | Refills: 0 | Status: SHIPPED | OUTPATIENT
Start: 2025-07-03 | End: 2025-07-10

## 2025-07-03 ASSESSMENT — ENCOUNTER SYMPTOMS
CONSTITUTIONAL NEGATIVE: 1
MUSCULOSKELETAL NEGATIVE: 1
RESPIRATORY NEGATIVE: 1
EYES NEGATIVE: 1
ENDOCRINE NEGATIVE: 1
PSYCHIATRIC NEGATIVE: 1
NEUROLOGICAL NEGATIVE: 1
GASTROINTESTINAL NEGATIVE: 1
CARDIOVASCULAR NEGATIVE: 1

## 2025-07-03 ASSESSMENT — PAIN SCALES - GENERAL: PAINLEVEL_OUTOF10: 0-NO PAIN

## 2025-08-11 ENCOUNTER — OFFICE VISIT (OUTPATIENT)
Dept: OBSTETRICS AND GYNECOLOGY | Facility: CLINIC | Age: 45
End: 2025-08-11
Payer: COMMERCIAL

## 2025-08-11 VITALS
HEIGHT: 62 IN | BODY MASS INDEX: 26.72 KG/M2 | HEART RATE: 72 BPM | WEIGHT: 145.2 LBS | DIASTOLIC BLOOD PRESSURE: 73 MMHG | SYSTOLIC BLOOD PRESSURE: 114 MMHG

## 2025-08-11 DIAGNOSIS — N39.3 STRESS INCONTINENCE: Primary | ICD-10-CM

## 2025-08-11 LAB
POC APPEARANCE, URINE: CLEAR
POC BILIRUBIN, URINE: NEGATIVE
POC BLOOD, URINE: ABNORMAL
POC COLOR, URINE: YELLOW
POC GLUCOSE, URINE: NEGATIVE MG/DL
POC KETONES, URINE: NEGATIVE MG/DL
POC LEUKOCYTES, URINE: NEGATIVE
POC NITRITE,URINE: NEGATIVE
POC PH, URINE: 6 PH
POC PROTEIN, URINE: NEGATIVE MG/DL
POC SPECIFIC GRAVITY, URINE: 1.01
POC UROBILINOGEN, URINE: 0.2 EU/DL

## 2025-08-11 PROCEDURE — 3008F BODY MASS INDEX DOCD: CPT | Performed by: OBSTETRICS & GYNECOLOGY

## 2025-08-11 PROCEDURE — 99211 OFF/OP EST MAY X REQ PHY/QHP: CPT | Performed by: OBSTETRICS & GYNECOLOGY

## 2025-08-11 PROCEDURE — 81003 URINALYSIS AUTO W/O SCOPE: CPT | Mod: QW | Performed by: OBSTETRICS & GYNECOLOGY

## 2025-08-11 ASSESSMENT — ENCOUNTER SYMPTOMS
DEPRESSION: 0
OCCASIONAL FEELINGS OF UNSTEADINESS: 0
LOSS OF SENSATION IN FEET: 0

## 2025-08-11 ASSESSMENT — PAIN SCALES - GENERAL: PAINLEVEL_OUTOF10: 0-NO PAIN

## 2025-08-12 ASSESSMENT — ENCOUNTER SYMPTOMS
RESPIRATORY NEGATIVE: 1
ENDOCRINE NEGATIVE: 1
NEUROLOGICAL NEGATIVE: 1
MUSCULOSKELETAL NEGATIVE: 1
CONSTITUTIONAL NEGATIVE: 1
PSYCHIATRIC NEGATIVE: 1
GASTROINTESTINAL NEGATIVE: 1
CARDIOVASCULAR NEGATIVE: 1
EYES NEGATIVE: 1

## 2026-02-09 ENCOUNTER — APPOINTMENT (OUTPATIENT)
Dept: OBSTETRICS AND GYNECOLOGY | Facility: CLINIC | Age: 46
End: 2026-02-09
Payer: COMMERCIAL

## (undated) DEVICE — RETRACTOR, SURGICAL, RING, PLASTIC, DISPOSABLE

## (undated) DEVICE — SEAL, UNIVERSAL, 5-12MM

## (undated) DEVICE — DRAPE, SHEET, THREE QUARTER, FAN FOLD, 57 X 77 IN

## (undated) DEVICE — OBTURATOR, BLADELESS , SU

## (undated) DEVICE — CLEAN KIT, ANTIFOG SCOPE, SEE SHARP 150MM

## (undated) DEVICE — SUTURE, MONOCRYL, 3-0, 27 IN, SH, UNDYED

## (undated) DEVICE — NEEDLE, INSUFFLATION, 13GAX120MM, DISP

## (undated) DEVICE — DRAPE, COLUMN, DAVINCI XI

## (undated) DEVICE — STAPLER, SKIN PROXIMATE, 35 WIDE

## (undated) DEVICE — COUNTER, NEEDLE, FOAM BLOCK, POP-N-COUNT, W/BLADEGUARD, W/ADHESIVE 40 COUNT, RED

## (undated) DEVICE — TUBE SET, PNEUMOCLEAR, SMOKE EVACU, HIGH-FLOW

## (undated) DEVICE — SUTURE, VICRYL, 0, 27 IN, UR-6, VIOLET

## (undated) DEVICE — SUTURE, MONOCRYL, 3-0, 27 IN, PS-2, UNDYED

## (undated) DEVICE — SPONGE, LAP, XRAY DECT, 4IN X 18IN, W/MASTER DMT, STERILE

## (undated) DEVICE — Device

## (undated) DEVICE — TUBING, SUCTION, NON-CONDUCTIVE, W/CONNECT,.25 IN X 12 FT, STERILE, LF

## (undated) DEVICE — LUBRICANT, ELECTROLUBE, F/ELECTRODE TIPS

## (undated) DEVICE — POSITIONING KIT, PAGAZZI, PINK PAD XL, W/ ARM AND HEAD REST

## (undated) DEVICE — SUTURE, VICRYL, 0, SH 27 TAPER NEEDLE, UNDYED, BRAIDED

## (undated) DEVICE — DRAPE, ARM XI

## (undated) DEVICE — COVER HANDLE LIGHT, STERIS, BLUE, STERILE

## (undated) DEVICE — FORCEPS, BIPOLAR MARYLAND, DAVINCI XI

## (undated) DEVICE — DRIVER, NEEDLE, MEGA SUTURE CUT, DAVINCI XI

## (undated) DEVICE — PACKING, VAGINAL, 2 IN X 2 YD

## (undated) DEVICE — STAY SET, SURGICAL, 5MM SHARP HOOK, 8PK

## (undated) DEVICE — GLOVE, SURGICAL, PROTEXIS PI MICRO, 7.0, PF, LF

## (undated) DEVICE — PUMP, STRYKERFLOW 2 & HANDPIECE W/10FT. IRRIGATION TUBING

## (undated) DEVICE — DRAPE PACK, LAVH, W/ATTACHED LEGGINGS, W/POUCH, 100 X 114 IN, LF, STERILE

## (undated) DEVICE — TIP, SUCTION, YANKAUER, BULB, ADULT

## (undated) DEVICE — SUTURE, STRATAFIX, 2-0, SPIRAL MONOCYRL PLUS, 23CM CT-2, DYED

## (undated) DEVICE — ADHESIVE, SKIN, DERMABOND ADVANCED, 15CM, PEN-STYLE

## (undated) DEVICE — FORCEPS, PROGRASP, DAVINCI XI

## (undated) DEVICE — TRAY, FOLEY, LUBRI-SIL, 16FR, COMPLETE CARE W/STATLOCK

## (undated) DEVICE — SUTURE, VICRYL PLUS 3-0, SH, 27IN

## (undated) DEVICE — GLOVE, SURGICAL, PROTEXIS PI MICRO, 6.5, PF, LF

## (undated) DEVICE — GLOVE, SURGICAL, PROTEXIS PI BLUE W/NEUTHERA, 7.0, PF, LF

## (undated) DEVICE — NEEDLE, HYPODERMIC, 25 G X 1.5 IN, A BEVEL, STERILE

## (undated) DEVICE — SYRINGE, 10 CC, LUER LOCK

## (undated) DEVICE — PREP TRAY, VAGINAL

## (undated) DEVICE — SUTURE, GORE-TEX, CV-2 THX-26 DA 36IN DA

## (undated) DEVICE — SCISSORS, MONOPOLAR, CURVED, 8MM

## (undated) DEVICE — PAD, GROUNDING, ELECTROSURGICAL, W/9 FT CABLE, POLYHESIVE II, ADULT, LF